# Patient Record
Sex: FEMALE | Race: OTHER | NOT HISPANIC OR LATINO
[De-identification: names, ages, dates, MRNs, and addresses within clinical notes are randomized per-mention and may not be internally consistent; named-entity substitution may affect disease eponyms.]

---

## 2018-01-09 ENCOUNTER — APPOINTMENT (OUTPATIENT)
Dept: ANTEPARTUM | Facility: CLINIC | Age: 28
End: 2018-01-09

## 2018-10-13 ENCOUNTER — EMERGENCY (EMERGENCY)
Facility: HOSPITAL | Age: 28
LOS: 1 days | Discharge: HOME | End: 2018-10-13
Attending: EMERGENCY MEDICINE | Admitting: EMERGENCY MEDICINE
Payer: MEDICAID

## 2018-10-13 VITALS
HEART RATE: 88 BPM | OXYGEN SATURATION: 100 % | RESPIRATION RATE: 16 BRPM | DIASTOLIC BLOOD PRESSURE: 74 MMHG | TEMPERATURE: 98 F | SYSTOLIC BLOOD PRESSURE: 117 MMHG

## 2018-10-13 PROCEDURE — 99282 EMERGENCY DEPT VISIT SF MDM: CPT

## 2018-10-13 NOTE — ED PROVIDER NOTE - PROGRESS NOTE DETAILS
Patient +UCG, pt with no vaginal bleeding or abdominal pain at present, desires to be discharged home, advised to take Prenatal vitamins and follow up with OB/GYN for prenatal care.

## 2018-10-13 NOTE — ED PROVIDER NOTE - PLAN OF CARE
Advance activity as tolerated.  Continue all previously prescribed medications as directed unless otherwise instructed.  Follow up with your primary care physician in 48-72 hours- bring copies of your results.  Return to the ER for worsening or persistent symptoms, and/or ANY NEW OR CONCERNING SYMPTOMS. If you have issues obtaining follow up, please call: [phone] to obtain a doctor or specialist who takes your insurance in your area.  You may call [phone] to make an appointment with the internal medicine clinic.

## 2018-10-13 NOTE — ED PROVIDER NOTE - CHIEF COMPLAINT
The patient is a 27y Female complaining of ,[email or web address]

## 2018-10-13 NOTE — ED PROVIDER NOTE - CARE PLAN
Principal Discharge DX:	First trimester pregnancy  Assessment and plan of treatment:	Advance activity as tolerated.  Continue all previously prescribed medications as directed unless otherwise instructed.  Follow up with your primary care physician in 48-72 hours- bring copies of your results.  Return to the ER for worsening or persistent symptoms, and/or ANY NEW OR CONCERNING SYMPTOMS. If you have issues obtaining follow up, please call: [phone] to obtain a doctor or specialist who takes your insurance in your area.  You may call [phone] to make an appointment with the internal medicine clinic.

## 2018-10-13 NOTE — ED PROVIDER NOTE - MEDICAL DECISION MAKING DETAILS
26 y/o F pt with no significant PMHX presents to the ED requesting a pregnancy test. Plan: pregnancy test and reassess.

## 2018-12-03 PROBLEM — Z00.00 ENCOUNTER FOR PREVENTIVE HEALTH EXAMINATION: Status: ACTIVE | Noted: 2018-12-03

## 2018-12-05 ENCOUNTER — RESULT REVIEW (OUTPATIENT)
Age: 28
End: 2018-12-05

## 2018-12-06 ENCOUNTER — APPOINTMENT (OUTPATIENT)
Dept: OBGYN | Facility: CLINIC | Age: 28
End: 2018-12-06
Payer: MEDICAID

## 2018-12-06 ENCOUNTER — OUTPATIENT (OUTPATIENT)
Dept: OUTPATIENT SERVICES | Facility: HOSPITAL | Age: 28
LOS: 1 days | End: 2018-12-06
Payer: MEDICAID

## 2018-12-06 ENCOUNTER — NON-APPOINTMENT (OUTPATIENT)
Age: 28
End: 2018-12-06

## 2018-12-06 VITALS
SYSTOLIC BLOOD PRESSURE: 115 MMHG | WEIGHT: 137 LBS | HEIGHT: 59 IN | BODY MASS INDEX: 27.62 KG/M2 | DIASTOLIC BLOOD PRESSURE: 80 MMHG

## 2018-12-06 DIAGNOSIS — Z83.3 FAMILY HISTORY OF DIABETES MELLITUS: ICD-10-CM

## 2018-12-06 DIAGNOSIS — Z34.00 ENCOUNTER FOR SUPERVISION OF NORMAL FIRST PREGNANCY, UNSPECIFIED TRIMESTER: ICD-10-CM

## 2018-12-06 LAB
APPEARANCE UR: CLEAR — SIGNIFICANT CHANGE UP
BILIRUB UR-MCNC: NEGATIVE — SIGNIFICANT CHANGE UP
COLOR SPEC: YELLOW — SIGNIFICANT CHANGE UP
DIFF PNL FLD: NEGATIVE — SIGNIFICANT CHANGE UP
GLUCOSE UR QL: NEGATIVE MG/DL — SIGNIFICANT CHANGE UP
KETONES UR-MCNC: ABNORMAL
LEUKOCYTE ESTERASE UR-ACNC: NEGATIVE — SIGNIFICANT CHANGE UP
NITRITE UR-MCNC: NEGATIVE — SIGNIFICANT CHANGE UP
PH UR: 7.5 — SIGNIFICANT CHANGE UP (ref 5–8)
PROT UR-MCNC: NEGATIVE MG/DL — SIGNIFICANT CHANGE UP
SP GR SPEC: 1.01 — SIGNIFICANT CHANGE UP (ref 1.01–1.02)
UROBILINOGEN FLD QL: NEGATIVE MG/DL — SIGNIFICANT CHANGE UP

## 2018-12-06 PROCEDURE — 81099 UNLISTED URINALYSIS PX: CPT | Mod: NC

## 2018-12-06 PROCEDURE — 83020 HEMOGLOBIN ELECTROPHORESIS: CPT

## 2018-12-06 PROCEDURE — 86901 BLOOD TYPING SEROLOGIC RH(D): CPT

## 2018-12-06 PROCEDURE — 87389 HIV-1 AG W/HIV-1&-2 AB AG IA: CPT

## 2018-12-06 PROCEDURE — 99213 OFFICE O/P EST LOW 20 MIN: CPT | Mod: NC,25

## 2018-12-06 PROCEDURE — 87086 URINE CULTURE/COLONY COUNT: CPT

## 2018-12-06 PROCEDURE — 36415 COLL VENOUS BLD VENIPUNCTURE: CPT | Mod: NC

## 2018-12-06 PROCEDURE — 81025 URINE PREGNANCY TEST: CPT

## 2018-12-06 PROCEDURE — 81025 URINE PREGNANCY TEST: CPT | Mod: NC

## 2018-12-06 PROCEDURE — G0463: CPT

## 2018-12-06 PROCEDURE — 86780 TREPONEMA PALLIDUM: CPT

## 2018-12-06 PROCEDURE — 81003 URINALYSIS AUTO W/O SCOPE: CPT

## 2018-12-06 PROCEDURE — 36415 COLL VENOUS BLD VENIPUNCTURE: CPT

## 2018-12-06 PROCEDURE — 83020 HEMOGLOBIN ELECTROPHORESIS: CPT | Mod: 26

## 2018-12-06 PROCEDURE — 86850 RBC ANTIBODY SCREEN: CPT

## 2018-12-06 PROCEDURE — 87624 HPV HI-RISK TYP POOLED RSLT: CPT

## 2018-12-06 PROCEDURE — 86900 BLOOD TYPING SEROLOGIC ABO: CPT

## 2018-12-06 PROCEDURE — 86787 VARICELLA-ZOSTER ANTIBODY: CPT

## 2018-12-07 DIAGNOSIS — Z34.02 ENCOUNTER FOR SUPERVISION OF NORMAL FIRST PREGNANCY, SECOND TRIMESTER: ICD-10-CM

## 2018-12-07 DIAGNOSIS — O09.292 SUPERVISION OF PREGNANCY WITH OTHER POOR REPRODUCTIVE OR OBSTETRIC HISTORY, SECOND TRIMESTER: ICD-10-CM

## 2018-12-07 DIAGNOSIS — Z3A.15 15 WEEKS GESTATION OF PREGNANCY: ICD-10-CM

## 2018-12-07 LAB
C TRACH RRNA SPEC QL NAA+PROBE: SIGNIFICANT CHANGE UP
C TRACH+GC RRNA SPEC QL PROBE: SIGNIFICANT CHANGE UP
CULTURE RESULTS: SIGNIFICANT CHANGE UP
HIV 1+2 AB+HIV1 P24 AG SERPL QL IA: SIGNIFICANT CHANGE UP
HPV HIGH+LOW RISK DNA PNL CVX: SIGNIFICANT CHANGE UP
N GONORRHOEA RRNA SPEC QL NAA+PROBE: SIGNIFICANT CHANGE UP
SPECIMEN SOURCE: SIGNIFICANT CHANGE UP
SPECIMEN SOURCE: SIGNIFICANT CHANGE UP
T PALLIDUM AB TITR SER: NEGATIVE — SIGNIFICANT CHANGE UP
T VAGINALIS RRNA SPEC QL NAA+PROBE: SIGNIFICANT CHANGE UP
VZV IGG FLD QL IA: 1103 INDEX — SIGNIFICANT CHANGE UP
VZV IGG FLD QL IA: POSITIVE — SIGNIFICANT CHANGE UP

## 2018-12-10 LAB
HEMOGLOBIN INTERPRETATION: SIGNIFICANT CHANGE UP
HGB A MFR BLD: 97.5 % — SIGNIFICANT CHANGE UP (ref 95.8–98)
HGB A2 MFR BLD: 2.5 % — SIGNIFICANT CHANGE UP (ref 2–3.2)

## 2018-12-13 ENCOUNTER — APPOINTMENT (OUTPATIENT)
Dept: ANTEPARTUM | Facility: CLINIC | Age: 28
End: 2018-12-13

## 2018-12-17 ENCOUNTER — APPOINTMENT (OUTPATIENT)
Dept: ANTEPARTUM | Facility: CLINIC | Age: 28
End: 2018-12-17
Payer: MEDICAID

## 2018-12-17 ENCOUNTER — ASOB RESULT (OUTPATIENT)
Age: 28
End: 2018-12-17

## 2018-12-17 DIAGNOSIS — Z3A.15 15 WEEKS GESTATION OF PREGNANCY: ICD-10-CM

## 2018-12-17 DIAGNOSIS — Z3A.16 16 WEEKS GESTATION OF PREGNANCY: ICD-10-CM

## 2018-12-17 PROCEDURE — 99203 OFFICE O/P NEW LOW 30 MIN: CPT | Mod: 25

## 2018-12-17 PROCEDURE — 76805 OB US >/= 14 WKS SNGL FETUS: CPT

## 2018-12-26 PROBLEM — Z3A.15 15 WEEKS GESTATION OF PREGNANCY: Status: RESOLVED | Noted: 2018-12-06 | Resolved: 2018-12-26

## 2018-12-26 PROBLEM — Z3A.16 16 WEEKS GESTATION OF PREGNANCY: Status: RESOLVED | Noted: 2018-12-26 | Resolved: 2018-12-26

## 2018-12-27 ENCOUNTER — NON-APPOINTMENT (OUTPATIENT)
Age: 28
End: 2018-12-27

## 2018-12-27 ENCOUNTER — OUTPATIENT (OUTPATIENT)
Dept: OUTPATIENT SERVICES | Facility: HOSPITAL | Age: 28
LOS: 1 days | End: 2018-12-27
Payer: MEDICAID

## 2018-12-27 ENCOUNTER — APPOINTMENT (OUTPATIENT)
Dept: OBGYN | Facility: CLINIC | Age: 28
End: 2018-12-27
Payer: MEDICAID

## 2018-12-27 VITALS
HEIGHT: 59 IN | DIASTOLIC BLOOD PRESSURE: 60 MMHG | WEIGHT: 140 LBS | BODY MASS INDEX: 28.22 KG/M2 | SYSTOLIC BLOOD PRESSURE: 91 MMHG

## 2018-12-27 DIAGNOSIS — Z34.00 ENCOUNTER FOR SUPERVISION OF NORMAL FIRST PREGNANCY, UNSPECIFIED TRIMESTER: ICD-10-CM

## 2018-12-27 LAB
BASOPHILS # BLD AUTO: 0.01 K/UL — SIGNIFICANT CHANGE UP (ref 0–0.2)
BASOPHILS NFR BLD AUTO: 0.1 % — SIGNIFICANT CHANGE UP (ref 0–2)
EOSINOPHIL # BLD AUTO: 0.07 K/UL — SIGNIFICANT CHANGE UP (ref 0–0.5)
EOSINOPHIL NFR BLD AUTO: 0.8 % — SIGNIFICANT CHANGE UP (ref 0–6)
HCT VFR BLD CALC: 31.8 % — LOW (ref 34.5–45)
HGB BLD-MCNC: 10.1 G/DL — LOW (ref 11.5–15.5)
IMM GRANULOCYTES NFR BLD AUTO: 0.3 % — SIGNIFICANT CHANGE UP (ref 0–1.5)
LYMPHOCYTES # BLD AUTO: 1.88 K/UL — SIGNIFICANT CHANGE UP (ref 1–3.3)
LYMPHOCYTES # BLD AUTO: 21.2 % — SIGNIFICANT CHANGE UP (ref 13–44)
MCHC RBC-ENTMCNC: 28.1 PG — SIGNIFICANT CHANGE UP (ref 27–34)
MCHC RBC-ENTMCNC: 31.8 GM/DL — LOW (ref 32–36)
MCV RBC AUTO: 88.3 FL — SIGNIFICANT CHANGE UP (ref 80–100)
MONOCYTES # BLD AUTO: 0.64 K/UL — SIGNIFICANT CHANGE UP (ref 0–0.9)
MONOCYTES NFR BLD AUTO: 7.2 % — SIGNIFICANT CHANGE UP (ref 2–14)
NEUTROPHILS # BLD AUTO: 6.23 K/UL — SIGNIFICANT CHANGE UP (ref 1.8–7.4)
NEUTROPHILS NFR BLD AUTO: 70.4 % — SIGNIFICANT CHANGE UP (ref 43–77)
PLATELET # BLD AUTO: 250 K/UL — SIGNIFICANT CHANGE UP (ref 150–400)
RBC # BLD: 3.6 M/UL — LOW (ref 3.8–5.2)
RBC # FLD: 14.1 % — SIGNIFICANT CHANGE UP (ref 10.3–14.5)
T4 FREE SERPL-MCNC: 1 NG/DL — SIGNIFICANT CHANGE UP (ref 0.9–1.8)
TSH SERPL-MCNC: 0.98 UIU/ML — SIGNIFICANT CHANGE UP (ref 0.27–4.2)
WBC # BLD: 8.86 K/UL — SIGNIFICANT CHANGE UP (ref 3.8–10.5)
WBC # FLD AUTO: 8.86 K/UL — SIGNIFICANT CHANGE UP (ref 3.8–10.5)

## 2018-12-27 PROCEDURE — 81003 URINALYSIS AUTO W/O SCOPE: CPT

## 2018-12-27 PROCEDURE — G0463: CPT

## 2018-12-27 PROCEDURE — 82677 ASSAY OF ESTRIOL: CPT

## 2018-12-27 PROCEDURE — 85027 COMPLETE CBC AUTOMATED: CPT

## 2018-12-27 PROCEDURE — 82105 ALPHA-FETOPROTEIN SERUM: CPT

## 2018-12-27 PROCEDURE — 84702 CHORIONIC GONADOTROPIN TEST: CPT

## 2018-12-27 PROCEDURE — 99213 OFFICE O/P EST LOW 20 MIN: CPT

## 2018-12-27 PROCEDURE — 86336 INHIBIN A: CPT

## 2018-12-27 PROCEDURE — 84443 ASSAY THYROID STIM HORMONE: CPT

## 2018-12-27 PROCEDURE — 84439 ASSAY OF FREE THYROXINE: CPT

## 2018-12-31 DIAGNOSIS — R00.0 TACHYCARDIA, UNSPECIFIED: ICD-10-CM

## 2018-12-31 DIAGNOSIS — Z3A.18 18 WEEKS GESTATION OF PREGNANCY: ICD-10-CM

## 2018-12-31 DIAGNOSIS — Z34.02 ENCOUNTER FOR SUPERVISION OF NORMAL FIRST PREGNANCY, SECOND TRIMESTER: ICD-10-CM

## 2018-12-31 LAB
2ND TRIMESTER DATA: SIGNIFICANT CHANGE UP
AFP SERPL-ACNC: SIGNIFICANT CHANGE UP
B-HCG FREE SERPL-MCNC: SIGNIFICANT CHANGE UP
CLINICAL BIOCHEMIST REVIEW: SIGNIFICANT CHANGE UP
DEMOGRAPHIC DATA: SIGNIFICANT CHANGE UP
INHIBIN A SERPL-MCNC: SIGNIFICANT CHANGE UP
SCREEN-FOOTER: SIGNIFICANT CHANGE UP
SCREEN-RECOMMENDATIONS: SIGNIFICANT CHANGE UP
U ESTRIOL SERPL-SCNC: SIGNIFICANT CHANGE UP

## 2019-01-08 ENCOUNTER — APPOINTMENT (OUTPATIENT)
Dept: NEUROLOGY | Facility: CLINIC | Age: 29
End: 2019-01-08
Payer: MEDICAID

## 2019-01-08 VITALS
BODY MASS INDEX: 28.22 KG/M2 | DIASTOLIC BLOOD PRESSURE: 70 MMHG | WEIGHT: 140 LBS | HEIGHT: 59 IN | SYSTOLIC BLOOD PRESSURE: 112 MMHG | HEART RATE: 94 BPM

## 2019-01-08 DIAGNOSIS — Z34.90 ENCOUNTER FOR SUPERVISION OF NORMAL PREGNANCY, UNSPECIFIED, UNSPECIFIED TRIMESTER: ICD-10-CM

## 2019-01-08 PROCEDURE — 95819 EEG AWAKE AND ASLEEP: CPT

## 2019-01-08 PROCEDURE — 99204 OFFICE O/P NEW MOD 45 MIN: CPT

## 2019-01-09 PROCEDURE — 95953: CPT

## 2019-01-10 PROCEDURE — 95953: CPT

## 2019-01-11 ENCOUNTER — OTHER (OUTPATIENT)
Age: 29
End: 2019-01-11

## 2019-01-15 ENCOUNTER — APPOINTMENT (OUTPATIENT)
Dept: NEUROLOGY | Facility: CLINIC | Age: 29
End: 2019-01-15
Payer: MEDICAID

## 2019-01-15 VITALS
BODY MASS INDEX: 28.22 KG/M2 | HEART RATE: 99 BPM | WEIGHT: 140 LBS | SYSTOLIC BLOOD PRESSURE: 111 MMHG | HEIGHT: 59 IN | DIASTOLIC BLOOD PRESSURE: 69 MMHG

## 2019-01-15 DIAGNOSIS — G40.909 EPILEPSY, UNSPECIFIED, NOT INTRACTABLE, W/OUT STATUS EPILEPTICUS: ICD-10-CM

## 2019-01-15 PROBLEM — Z34.90 PREGNANCY: Noted: 2019-01-15

## 2019-01-15 PROCEDURE — 99214 OFFICE O/P EST MOD 30 MIN: CPT

## 2019-01-15 NOTE — PHYSICAL EXAM
[FreeTextEntry1] : alert and oriented x 3, speech fluent, names easily, follows requests, good recall for recent and remote events.\par EOM full without sustained nystagmus, PERRL, face symmetrical, no dysarthria\par Motor - full strength in all extremities. normal rapid-alternating movements.\par Sensory - intact LT bilaterally\par Coord - no tremor, ataxia\par Gait - stands without difficulty, normal gait.

## 2019-01-15 NOTE — ASSESSMENT
[FreeTextEntry1] : 28 years old woman with myoclonic jerks unclear if epileptic or not, no history of convulsion. The response to AED in the past make the epileptic etiology most likely or possibly in the context of idiopathic generalized epilepsy like AGUSTIN in the right clinical context.  Her neurological exam is nonfocal and she had several myoclonic jerks while in the office.\par \par Plan: \par 1. ambulatory EEG for event characterization. \par 2. If events captured and with EEG correlate may consider an AED with safer profile for pregnancy, possibly Keppra.\par 3. follow up in 1 week and further management will be discussed based on results of EEG.\par \par

## 2019-01-15 NOTE — HISTORY OF PRESENT ILLNESS
[FreeTextEntry1] : Ms. PASCUAL is a 28 years old  2( first pregnancy still birth due to diaphragmatic hernia) with history of myoclonic jerks since 2006 while she was living in [facility]. In  she immigrated in  and was evaluated by a neurologist who started her on VPA and Keppra(?). Brain MRI and EEG were done but results are not available. \par \par Now she is 20 weeks pregnant and continue to have frequent myoclonic jerks but no generalized seizures described. Her symptoms are worsen by sleep deprivation and stress  Unclear if they are worsen in am or not. She endorsed improvement in her symptoms while on VPA and Keppra but she discontinued medication several months prior. She is here at her ObGyn recommendation on further management of her condition.

## 2019-01-15 NOTE — END OF VISIT
[FreeTextEntry3] : Ms. MOST PASCUAL was seen with Epilepsy fellow, Dr. Shama Riley.  I reviewed history with patient and family, examined Ms. PASCUAL , and edited the note.\par

## 2019-01-16 ENCOUNTER — APPOINTMENT (OUTPATIENT)
Dept: CARDIOLOGY | Facility: CLINIC | Age: 29
End: 2019-01-16
Payer: MEDICAID

## 2019-01-16 ENCOUNTER — ASOB RESULT (OUTPATIENT)
Age: 29
End: 2019-01-16

## 2019-01-16 ENCOUNTER — APPOINTMENT (OUTPATIENT)
Dept: ANTEPARTUM | Facility: CLINIC | Age: 29
End: 2019-01-16
Payer: MEDICAID

## 2019-01-16 VITALS
BODY MASS INDEX: 28.22 KG/M2 | HEART RATE: 107 BPM | TEMPERATURE: 98.4 F | HEIGHT: 59 IN | DIASTOLIC BLOOD PRESSURE: 67 MMHG | SYSTOLIC BLOOD PRESSURE: 102 MMHG | OXYGEN SATURATION: 98 % | WEIGHT: 140 LBS

## 2019-01-16 PROCEDURE — 93000 ELECTROCARDIOGRAM COMPLETE: CPT

## 2019-01-16 PROCEDURE — 99204 OFFICE O/P NEW MOD 45 MIN: CPT

## 2019-01-16 PROCEDURE — 76817 TRANSVAGINAL US OBSTETRIC: CPT | Mod: 59

## 2019-01-16 PROCEDURE — 76811 OB US DETAILED SNGL FETUS: CPT

## 2019-01-18 NOTE — ASSESSMENT
[FreeTextEntry1] : 28 years old woman with myoclonic jerks unclear if epileptic or not, no history of convulsion. The response to AED in the past make the epileptic etiology possible as a result of idiopathic generalized epilepsy like AGUSTIN in the right clinical context.  Her neurological exam is nonfocal and she had several myoclonic jerks while in the office. 48h ambulatory EEG is negative, however, no video available to correlate with jerks.\par \par Plan: \par 1. EMU eval to capture video-EEG of jerks with addition EMG leads and definitively determine that there is no EEG correlate.  \par 2. would not start AED at this point with results thus far of evaluation. Ms. PASCUAL has never had convulsion. \par 3. Ms. PASCUAL is uncertain as to how she would like to proceed, will discuss with her  and let us know about EMU eval. \par \par Greater than 50% of the encounter time was spent on counseling and coordination of care for reviewing records in [employer], discussion with patient regarding plan. I have spent 25 minutes of face to face time with the patient reviewing the cause of seizures or seizure-like events, assessing the risk of recurrence, educating the patient or family to recognize seizures, and discussing possible treatment options and possible side effects of seizure medications. I also discussed seizure safety, and ways of reducing seizure risk, and value of EMU evaluation in determining cause of jerks.\par

## 2019-01-18 NOTE — HISTORY OF PRESENT ILLNESS
[FreeTextEntry1] : *** 01/15/2019 ***\par Ms. PASCUAL returns for f/u.  No interval problems. The 48 hours EEG was normal.\par Ms. PASCUAL is still having myoclonic jerks.\par \par 2019\par Ms. PASCUAL is a 28 years old  2( first pregnancy still birth due to diaphragmatic hernia) with history of myoclonic jerks since  while she was living in [facility]. In  she immigrated in  and was evaluated by a neurologist who started her on VPA and Keppra(?). Brain MRI and EEG were done but results are not available. \par \par Now she is 20 weeks pregnant and continue to have frequent myoclonic jerks but no generalized seizures described. Her symptoms are worsen by sleep deprivation and stress  Unclear if they are worsen in am or not. She endorsed improvement in her symptoms while on VPA and Keppra but she discontinued medication several months prior. She is here at her ObGyn recommendation on further management of her condition.

## 2019-01-24 ENCOUNTER — OUTPATIENT (OUTPATIENT)
Dept: OUTPATIENT SERVICES | Facility: HOSPITAL | Age: 29
LOS: 1 days | End: 2019-01-24
Payer: MEDICAID

## 2019-01-24 ENCOUNTER — NON-APPOINTMENT (OUTPATIENT)
Age: 29
End: 2019-01-24

## 2019-01-24 ENCOUNTER — APPOINTMENT (OUTPATIENT)
Dept: OBGYN | Facility: CLINIC | Age: 29
End: 2019-01-24
Payer: MEDICAID

## 2019-01-24 VITALS
SYSTOLIC BLOOD PRESSURE: 113 MMHG | HEIGHT: 59 IN | DIASTOLIC BLOOD PRESSURE: 69 MMHG | BODY MASS INDEX: 29.23 KG/M2 | WEIGHT: 145 LBS

## 2019-01-24 DIAGNOSIS — Z34.00 ENCOUNTER FOR SUPERVISION OF NORMAL FIRST PREGNANCY, UNSPECIFIED TRIMESTER: ICD-10-CM

## 2019-01-24 PROCEDURE — 81003 URINALYSIS AUTO W/O SCOPE: CPT

## 2019-01-24 PROCEDURE — G0463: CPT

## 2019-01-24 PROCEDURE — 99213 OFFICE O/P EST LOW 20 MIN: CPT | Mod: NC

## 2019-01-24 PROCEDURE — 81099 UNLISTED URINALYSIS PX: CPT | Mod: NC

## 2019-01-29 ENCOUNTER — OUTPATIENT (OUTPATIENT)
Dept: OUTPATIENT SERVICES | Facility: HOSPITAL | Age: 29
LOS: 1 days | End: 2019-01-29
Payer: MEDICAID

## 2019-01-29 DIAGNOSIS — Z3A.22 22 WEEKS GESTATION OF PREGNANCY: ICD-10-CM

## 2019-01-29 DIAGNOSIS — Z34.02 ENCOUNTER FOR SUPERVISION OF NORMAL FIRST PREGNANCY, SECOND TRIMESTER: ICD-10-CM

## 2019-01-29 DIAGNOSIS — O09.292 SUPERVISION OF PREGNANCY WITH OTHER POOR REPRODUCTIVE OR OBSTETRIC HISTORY, SECOND TRIMESTER: ICD-10-CM

## 2019-01-29 DIAGNOSIS — R00.0 TACHYCARDIA, UNSPECIFIED: ICD-10-CM

## 2019-01-29 DIAGNOSIS — G25.3 MYOCLONUS: ICD-10-CM

## 2019-01-29 PROCEDURE — 93306 TTE W/DOPPLER COMPLETE: CPT

## 2019-01-30 NOTE — ASSESSMENT
[FreeTextEntry1] : 28-year-old female at ~20 weeks gestation with history of myoclonic jerks, who presents for evaluation of tachycardia. \par \par 1.Tachycardia: This is a nonspecific finding and likely reflects a normal physiologic response. Possible factors include patient's borderline anemia of pregnancy, versus some anxiety in the health care setting, given the heart rate variability observed over today's visit\par -Patient was reassured that this is most likely a benign finding\par -I recommended that patient undergo an echocardiogram to rule out any occult structural heart disease that may contribute to tachycardia, though my clinical suspicion for this is very low based on patient's otherwise lack of symptoms as well as unremarkable physical exam\par \par Will call patient with results at [phone], alternate # is her aunt at [phone]

## 2019-01-30 NOTE — HISTORY OF PRESENT ILLNESS
[FreeTextEntry1] : 28-year-old female at ~20 weeks gestation with history of myoclonic jerks, who presents for evaluation of tachycardia. Patient denies any specific complaints, though she has noticed that she has been getting some exertional dyspnea when going up stairs that has progressed throughout her pregnancy. However, patient states the symptoms do not limit her activity level. She otherwise denies any chest pain, palpitations, lightheadedness, or syncope. No hot flashes or uncomfortable sensation of feeling hot, no orthopnea, lower extremity edema, or PND. Patient reports that she occasionally feels nervous at physician's offices and feels that her heart rate becomes faster during those times. Patient's heart rate changed from the 90s to 112 bpm during the time of my examination, however she did not feel that her heart was going fast at that time, nor did she have any complaints during my exam.

## 2019-01-30 NOTE — ADDENDUM
[FreeTextEntry1] : ADDENDUM 1/30: Echocardiogram showed grossly preserved EF, normal diastolic function, otherwise unremarkable

## 2019-02-01 ENCOUNTER — INPATIENT (INPATIENT)
Facility: HOSPITAL | Age: 29
LOS: 0 days | Discharge: HOME | DRG: 833 | End: 2019-02-02
Attending: PSYCHIATRY & NEUROLOGY | Admitting: PSYCHIATRY & NEUROLOGY
Payer: MEDICAID

## 2019-02-01 VITALS — WEIGHT: 136.47 LBS | HEIGHT: 59 IN

## 2019-02-01 DIAGNOSIS — Z3A.23 23 WEEKS GESTATION OF PREGNANCY: ICD-10-CM

## 2019-02-01 DIAGNOSIS — G40.909 EPILEPSY, UNSPECIFIED, NOT INTRACTABLE, WITHOUT STATUS EPILEPTICUS: ICD-10-CM

## 2019-02-01 LAB
ALBUMIN SERPL ELPH-MCNC: 4.2 G/DL — SIGNIFICANT CHANGE UP (ref 3.3–5)
ALP SERPL-CCNC: 59 U/L — SIGNIFICANT CHANGE UP (ref 40–120)
ALT FLD-CCNC: 13 U/L — SIGNIFICANT CHANGE UP (ref 10–45)
ANION GAP SERPL CALC-SCNC: 13 MMOL/L — SIGNIFICANT CHANGE UP (ref 5–17)
AST SERPL-CCNC: 13 U/L — SIGNIFICANT CHANGE UP (ref 10–40)
BASOPHILS # BLD AUTO: 0.01 K/UL — SIGNIFICANT CHANGE UP (ref 0–0.2)
BASOPHILS NFR BLD AUTO: 0.1 % — SIGNIFICANT CHANGE UP (ref 0–2)
BILIRUB SERPL-MCNC: 0.1 MG/DL — LOW (ref 0.2–1.2)
BUN SERPL-MCNC: 5 MG/DL — LOW (ref 7–23)
CALCIUM SERPL-MCNC: 9.1 MG/DL — SIGNIFICANT CHANGE UP (ref 8.4–10.5)
CHLORIDE SERPL-SCNC: 103 MMOL/L — SIGNIFICANT CHANGE UP (ref 96–108)
CO2 SERPL-SCNC: 21 MMOL/L — LOW (ref 22–31)
CREAT SERPL-MCNC: 0.36 MG/DL — LOW (ref 0.5–1.3)
EOSINOPHIL # BLD AUTO: 0.04 K/UL — SIGNIFICANT CHANGE UP (ref 0–0.5)
EOSINOPHIL NFR BLD AUTO: 0.4 % — SIGNIFICANT CHANGE UP (ref 0–6)
GLUCOSE SERPL-MCNC: 103 MG/DL — HIGH (ref 70–99)
HCT VFR BLD CALC: 30.6 % — LOW (ref 34.5–45)
HGB BLD-MCNC: 9.8 G/DL — LOW (ref 11.5–15.5)
IMM GRANULOCYTES NFR BLD AUTO: 0.4 % — SIGNIFICANT CHANGE UP (ref 0–1.5)
LYMPHOCYTES # BLD AUTO: 1.93 K/UL — SIGNIFICANT CHANGE UP (ref 1–3.3)
LYMPHOCYTES # BLD AUTO: 20.9 % — SIGNIFICANT CHANGE UP (ref 13–44)
MCHC RBC-ENTMCNC: 28.4 PG — SIGNIFICANT CHANGE UP (ref 27–34)
MCHC RBC-ENTMCNC: 32 GM/DL — SIGNIFICANT CHANGE UP (ref 32–36)
MCV RBC AUTO: 88.7 FL — SIGNIFICANT CHANGE UP (ref 80–100)
MONOCYTES # BLD AUTO: 0.61 K/UL — SIGNIFICANT CHANGE UP (ref 0–0.9)
MONOCYTES NFR BLD AUTO: 6.6 % — SIGNIFICANT CHANGE UP (ref 2–14)
NEUTROPHILS # BLD AUTO: 6.62 K/UL — SIGNIFICANT CHANGE UP (ref 1.8–7.4)
NEUTROPHILS NFR BLD AUTO: 71.6 % — SIGNIFICANT CHANGE UP (ref 43–77)
PLATELET # BLD AUTO: 191 K/UL — SIGNIFICANT CHANGE UP (ref 150–400)
POTASSIUM SERPL-MCNC: 3.7 MMOL/L — SIGNIFICANT CHANGE UP (ref 3.5–5.3)
POTASSIUM SERPL-SCNC: 3.7 MMOL/L — SIGNIFICANT CHANGE UP (ref 3.5–5.3)
PROT SERPL-MCNC: 7.1 G/DL — SIGNIFICANT CHANGE UP (ref 6–8.3)
RBC # BLD: 3.45 M/UL — LOW (ref 3.8–5.2)
RBC # FLD: 13.9 % — SIGNIFICANT CHANGE UP (ref 10.3–14.5)
SODIUM SERPL-SCNC: 137 MMOL/L — SIGNIFICANT CHANGE UP (ref 135–145)
WBC # BLD: 9.25 K/UL — SIGNIFICANT CHANGE UP (ref 3.8–10.5)
WBC # FLD AUTO: 9.25 K/UL — SIGNIFICANT CHANGE UP (ref 3.8–10.5)

## 2019-02-01 PROCEDURE — 95819 EEG AWAKE AND ASLEEP: CPT | Mod: 26

## 2019-02-01 PROCEDURE — 99221 1ST HOSP IP/OBS SF/LOW 40: CPT

## 2019-02-01 PROCEDURE — 99223 1ST HOSP IP/OBS HIGH 75: CPT | Mod: GC

## 2019-02-01 PROCEDURE — 95951: CPT | Mod: 26

## 2019-02-01 NOTE — H&P ADULT - ASSESSMENT
Ms. PASCUAL is a 28 years old  2 (first pregnancy still birth due to diaphragmatic hernia) with history of myoclonic jerks since  while she was living in [facility]. In  she [place] in [place] and was evaluated by a neurologist who started her on VPA and Keppra(?). Brain MRI and EEG were done but results are not available. Now she is 21 weeks pregnant and continue to have frequent myoclonic jerks but no generalized seizures described. Her symptoms are worsen by sleep deprivation and stress Unclear if they are worsen in am or not. She endorsed improvement in her symptoms while on VPA and Keppra but she discontinued medication several months prior. 48h ambulatory EEG is negative, however, no video available to correlate with jerks. Her neurological examination was unremarkable.     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

## 2019-02-01 NOTE — CONSULT NOTE ADULT - SUBJECTIVE AND OBJECTIVE BOX
R2 Ob Consult    28y  23w1d based on LMP 18 consistent with first trimester sono presents with myoclonic jerks. OB consulted for pregnancy. She states that the pregnancy has only been significant for the myoclonic jerks that encompass her full body. She reports intermittent HAs that are mild and resolve with rest. She has no obstetrical complaints. She denies fevers, chills, sweats, CP, SOB, palpitations, abd pain, vaginal discharge/bleeding, LOF, dysuria. Reports good fetal movement.     HPI:  Ms. PASCUAL is a 28 years old  2 (first pregnancy still birth due to diaphragmatic hernia) with history of myoclonic jerks since  while she was living in [facility]. In  she immigrated in  and was evaluated by a neurologist who started her on VPA and Keppra(?). Brain MRI and EEG were done but results are not available. Now she is 21 weeks pregnant and continue to have frequent myoclonic jerks but no generalized seizures described. Her symptoms are worsen by sleep deprivation and stress Unclear if they are worsen in am or not. She endorsed improvement in her symptoms while on VPA and Keppra but she discontinued medication several months prior. 48h ambulatory EEG is negative, however, no video available to correlate with jerks. (2019 17:37)      OB/GYN HISTORY:   [place]: 2  Parity: 0  Term: 0  : 1 Termination of pregnancy at 23 weeks in 2017 for congenital diaphragmatic hernia, was induced with medication and delivered vaginally  MAB/TAB/Ectopic: 0  Livin    Last Menstrual Period 18    Name of GYN Physician: [email or web address] Dr. Sanz  Denies fibroids, abnl paps, ovarian cysts, STIs     PAST MEDICAL & SURGICAL HISTORY:  History of seizures - not currently on any anti-epileptics      REVIEW OF SYSTEMS: Negative except for outlined in HPI    MEDICATIONS  (STANDING):    MEDICATIONS  (PRN):      Allergies  No Known Allergies    SOCIAL HISTORY: denies x3    FAMILY HISTORY: denies HTN, DM, bleeding and clotting disorders    Vital Signs Last 24 Hrs  T(C): 36.9 (2019 18:19), Max: 36.9 (2019 18:19)  T(F): 98.4 (2019 18:19), Max: 98.4 (2019 18:19)  HR: 102 (2019 18:19) (102 - 102)  BP: 99/67 (2019 18:19) (99/67 - 99/67)  BP(mean): --  RR: 18 (2019 18:19) (18 - 18)  SpO2: 100% (2019 18:19) (100% - 100%)    PHYSICAL EXAM:  Constitutional: alert and oriented x 3  Respiratory: clear  Cardiovascular: regular rate and rhythm  Gastrointestinal: soft, non tender, gravid with fundal height at umbilicus, + bowel sounds. No hepatosplenomegaly, no palpable masses  TAUS: fundal anterior placenta, vertex presentation, , gross fetal movement, MVP 4.3

## 2019-02-01 NOTE — CONSULT NOTE ADULT - ASSESSMENT
27 yo  23w1d JOBY 19 based on LMP consistent with first trimester US a/w myoclonic jerks. Consulted for viable pregnancy. No acute obstetrical concerns at this time.

## 2019-02-01 NOTE — H&P ADULT - HISTORY OF PRESENT ILLNESS
Ms. PASCUAL is a 28 years old  2 (first pregnancy still birth due to diaphragmatic hernia) with history of myoclonic jerks since 2006 while she was living in [facility]. In  [place] in [place] and was evaluated by a neurologist who started her on VPA and Keppra(?). Brain MRI and EEG were done but results are not available. Now she is 21 weeks pregnant and continue to have frequent myoclonic jerks but no generalized seizures described. Her symptoms are worsen by sleep deprivation and stress Unclear if they are worsen in am or not. She endorsed improvement in her symptoms while on VPA and Keppra but she discontinued medication several months prior. 48h ambulatory EEG is negative, however, no video available to correlate with jerks.

## 2019-02-01 NOTE — CONSULT NOTE ADULT - PROBLEM SELECTOR RECOMMENDATION 9
- no acute interventions at this time  - depending on medications started for myoclonic jerks, would consider monitoring for pregnancy but no monitoring needed unless she had obstetrical complaints (contractions, loss of amniotic fluid, vaginal bleeding)    d/w Dr. Felicity Pizano pgy2 - no acute interventions at this time  - recommend daily FHR checks  - please contact Ob/GYN if she begins to have any obstetrical complaints (contractions, loss of amniotic fluid, vaginal bleeding, decreased fetal movement)    d/w Dr. Felicity Pizano pgy2

## 2019-02-01 NOTE — CONSULT NOTE ADULT - ATTENDING COMMENTS
This 29y/o  at 23.1wks admitted to Neurology 2nd to myoclonic jerks, was discussed with me.  I have read and reviewed the above assessment and plan and I agree.    Pt with hx of myoclonic jerks (no generalized seizures and not witnessed) and currently being monitored on Neurology service.  Normal EEG.  No OB complications at this time. As per M, to monitor FHR daily.    Please call OB service if any questions or complications associated with pregnancy.    Thank you for the consult.    Dr. Blevins

## 2019-02-02 ENCOUNTER — TRANSCRIPTION ENCOUNTER (OUTPATIENT)
Age: 29
End: 2019-02-02

## 2019-02-02 VITALS
RESPIRATION RATE: 18 BRPM | OXYGEN SATURATION: 99 % | HEART RATE: 102 BPM | TEMPERATURE: 98 F | SYSTOLIC BLOOD PRESSURE: 102 MMHG | DIASTOLIC BLOOD PRESSURE: 66 MMHG

## 2019-02-02 PROCEDURE — 95951: CPT | Mod: 26,52

## 2019-02-02 PROCEDURE — 80053 COMPREHEN METABOLIC PANEL: CPT

## 2019-02-02 PROCEDURE — 99233 SBSQ HOSP IP/OBS HIGH 50: CPT | Mod: GC

## 2019-02-02 PROCEDURE — 99232 SBSQ HOSP IP/OBS MODERATE 35: CPT | Mod: 25

## 2019-02-02 PROCEDURE — 95951: CPT

## 2019-02-02 PROCEDURE — 95819 EEG AWAKE AND ASLEEP: CPT

## 2019-02-02 PROCEDURE — 85027 COMPLETE CBC AUTOMATED: CPT

## 2019-02-02 NOTE — DISCHARGE NOTE ADULT - CARE PROVIDER_API CALL
Donn Aviles (MD)  Clinical Neurophysiology; EEGEpilepsy; Neurology  [address]  [place] [address]  Phone: [phone]  Fax: [phone]

## 2019-02-02 NOTE — PROVIDER CONTACT NOTE (OTHER) - ASSESSMENT
Pt alert and oriented. No complaints of dizziness. Pt states that she did not eat much, only an orange and a banana and that is why she believes her blood pressure is low.

## 2019-02-02 NOTE — DISCHARGE NOTE ADULT - NS AS DC STROKE ED MATERIALS
September 5, 2017      Gina Robles MD  [address] [place] [address]  [address]  [place] [address]           [facility]  [address]  [facility] [address]  Phone: [phone]  Fax: [phone]          Patient: Polo oJnes   MR Number: [id number]   YOB: 1976   Date of Visit: 9/5/2017       Dear Dr. Gina Robles:    Thank you for referring Polo Jones to me for evaluation. Attached you will find relevant portions of my assessment and plan of care.    If you have questions, please do not hesitate to call me. I look forward to following Polo Jones along with you.    Sincerely,    Jere Vizcaino MD    Enclosure  CC:  No Recipients    If you would like to receive this communication electronically, please contact [email or web address] or [phone] to request more information on [employer] Link access.    For providers and/or their staff who would like to refer a patient to [facility], please contact us through our one-stop-shop provider referral line, [facility], at [phone].    If you feel you have received this communication in error or would no longer like to receive these types of communications, please e-mail [email or web address]        Call 911 for Stroke/Stroke Warning Signs and Symptoms/Risk Factors for Stroke/Prescribed Medications/Need for Followup After Discharge/Stroke Education Booklet

## 2019-02-02 NOTE — DISCHARGE NOTE ADULT - HOSPITAL COURSE
28 years old  2 (first pregnancy still birth due to diaphragmatic hernia) with history of myoclonic jerks since 2006 while she was living in [facility]. In  she immigrated in  and was evaluated by a neurologist who started her on VPA and Keppra(?). Brain MRI and EEG were done but results are not available. Now she is 21 weeks pregnant and continue to have frequent myoclonic jerks but no generalized seizures described. Her symptoms are worsen by sleep deprivation and stress Unclear if they are worsen in am or not. She endorsed improvement in her symptoms while on VPA and Keppra but she discontinued medication several months prior. 48h ambulatory EEG is negative, however, no video available to correlate with jerks. Her neurological examination was unremarkable.    Patient admitted to EMU for further monitoring.  Since admission, there has been no noted seizure activity on EEG.  Patient is neurologically stable for discharge home with followup at epilepsy neurology. 28 years old  2 (first pregnancy still birth due to diaphragmatic hernia) with history of myoclonic jerks since 2006 while she was living in [facility]. In  [place] in [place] and was evaluated by a neurologist who started her on VPA and Keppra(?). Brain MRI and EEG were done but results are not available. Now she is 21 weeks pregnant and continue to have frequent myoclonic jerks but no generalized seizures described. Her symptoms are worsen by sleep deprivation and stress Unclear if they are worsen in am or not. She endorsed improvement in her symptoms while on VPA and Keppra but she discontinued medication several months prior. 48h ambulatory EEG is negative, however, no video available to correlate with jerks. Her neurological examination was unremarkable.    Patient admitted to EMU for further monitoring. Numerous habitual jerks captured without ictal abnormality on EEG. Patient is neurologically stable for discharge home with followup at epilepsy neurology.

## 2019-02-02 NOTE — DISCHARGE NOTE ADULT - ADDITIONAL INSTRUCTIONS
Patient will followup with Dr Aviles on 3/6/19 at 3:30PM at [address] [place] [address]. Patient will followup with Dr Aviles on 3/6/19 at 12:30PM at [address] [place] [address].

## 2019-02-02 NOTE — DISCHARGE NOTE ADULT - CARE PLAN
Principal Discharge DX:	Movement disorder  Goal:	secondary prevention  Assessment and plan of treatment:	Patient should followup with epilepsy neurology for further management

## 2019-02-02 NOTE — PROVIDER CONTACT NOTE (OTHER) - ASSESSMENT
Patient is AOx4, denies pain, vitals signs intact (95/64, 98 HR, 97.5 F, 99% RA, 20 RR), no post ictal. Stated episode only lasted a few seconds with a few body jerks.

## 2019-02-02 NOTE — PROVIDER CONTACT NOTE (OTHER) - ACTION/TREATMENT ORDERED:
As per Erin IRIZARRY BP OK, will continue to monitor as per protocol. Advice patient to consume more fluids and food.

## 2019-02-02 NOTE — DISCHARGE NOTE ADULT - PATIENT PORTAL LINK FT
You can access the [facility] Patient Portal, offered by [facility], by registering with the following website: [email or web address]

## 2019-02-02 NOTE — CHART NOTE - NSCHARTNOTEFT_GEN_A_CORE
R3 OB     Pt seen and examined with Dr. Cox. She has no obstetrical complaints. She has a history of IOL termination due to congenital diaphragmatic hernia at 23wks and was told that depakote and keppra caused the malformation.  She was counseled that it is safe to start antiepileptics at this gestational age given that organogenesis is completed. She does not want to take any medications while she is pregnant due to fear of harming the baby. It was explained that the benefits of AED would outweigh any risks at this gestational age, however patient still refusing. Would recommend restarting any necessary antiseizure medications as determined indicated by neurology.     FH check 140-150s.     d/w Dr. Kenny Srinivasan PGY3 R3 OB     Pt seen and examined with Dr. Cox. She has no obstetrical complaints. She has a history of IOL termination due to congenital diaphragmatic hernia at 23wks and was told that depakote and keppra caused the malformation.  She was counseled that it is safe to start antiepileptics at this gestational age given that organogenesis is completed. She does not want to take any medications while she is pregnant due to fear of harming the baby. It was explained that the benefits of AED would outweigh any risks at this gestational age, however patient still refusing. Would recommend restarting any necessary antiseizure medications as determined indicated by neurology.     FH check 140-150s.     d/w Dr. Kenny Miramontes-Jazzy PGY3    MFM Attending.    I saw and evaluated the patient at the bedside, and as Dr. Miramontes points out, the patient is refusing any antiepileptic medications. We discussed the specific medications suggested and their safety at this gestational age, and she declines. Active seizures may be more harmful in pregnancy than treatment. The patient also previously had a consultation on this issue with our MFM colleague, Dr. Costa. Although any suggested medications are likely to be safe at this gestational age, please consult with us regarding safety prior to beginning administration.

## 2019-02-06 ENCOUNTER — APPOINTMENT (OUTPATIENT)
Dept: NEUROLOGY | Facility: CLINIC | Age: 29
End: 2019-02-06
Payer: MEDICAID

## 2019-02-06 VITALS
BODY MASS INDEX: 29.23 KG/M2 | SYSTOLIC BLOOD PRESSURE: 111 MMHG | HEART RATE: 110 BPM | HEIGHT: 59 IN | DIASTOLIC BLOOD PRESSURE: 74 MMHG | WEIGHT: 145 LBS

## 2019-02-06 DIAGNOSIS — G25.3 MYOCLONUS: ICD-10-CM

## 2019-02-06 PROCEDURE — 99214 OFFICE O/P EST MOD 30 MIN: CPT

## 2019-02-08 PROBLEM — G25.3 MYOCLONIC JERKING: Noted: 2019-01-15

## 2019-02-08 NOTE — ASSESSMENT
[FreeTextEntry1] : 28 years old woman with axial and facial jerks that have no correlate on EEG. EMU video also reviewed with Dr. Brown, movement disorder specialist. It is my impression, endorsed by Dr. Brown, that jerks appear to be tics, not myoclonic/epileptic twitch. \par \par Plan: \par 1. EMU eval captured jerks - neck flexion, face grimace, blepharospasm - no EEG correlate - likely tics. \par 2. would not start AED. Ms. PASCUAL has never had convulsion. Seizure disorder is unlikely, more likely has tic d/o and course is benign. \par 3. RTC - June - after delivery to discuss whether she wishes to take medication to suppress tics.\par \par Greater than 50% of the encounter time was spent on counseling and coordination of care for reviewing records in [employer], discussion with patient regarding plan. I have spent 25 minutes of face to face time with the patient reviewing the cause of tics, and discussing possible treatment options.

## 2019-02-08 NOTE — HISTORY OF PRESENT ILLNESS
[FreeTextEntry1] : *** 2019 ***\par Ms. PASCUAL returns for f/u after EMU evaluation.  EMU video captured multiple jerks mostly involving head dip, sometimes facial twitch or blepharospsam. No paroxysmal EEG activity noted through recording. Video annotations note that Ms. PASCUAL states that she can voluntarily suppress jerks, but that the jerks will emerge later, sometimes in series. Ms. PASCUAL also endorses that she feels an urge with the jerks. \par \par *** 01/15/2019 ***\par Ms. PASCUAL returns for f/u.  No interval problems. The 48 hours EEG was normal.\par Ms. PASCUAL is still having myoclonic jerks.\par \par 2019\par Ms. PASCUAL is a 28 years old  2( first pregnancy still birth due to diaphragmatic hernia) with history of myoclonic jerks since  while she was living in [facility]. In  she immigrated in  and was evaluated by a neurologist who started her on VPA and Keppra(?). Brain MRI and EEG were done but results are not available. \par \par Now she is 20 weeks pregnant and continue to have frequent myoclonic jerks but no generalized seizures described. Her symptoms are worsen by sleep deprivation and stress  Unclear if they are worsen in am or not. She endorsed improvement in her symptoms while on VPA and Keppra but she discontinued medication several months prior. She is here at her ObGyn recommendation on further management of her condition.

## 2019-02-13 ENCOUNTER — ASOB RESULT (OUTPATIENT)
Age: 29
End: 2019-02-13

## 2019-02-13 ENCOUNTER — APPOINTMENT (OUTPATIENT)
Dept: ANTEPARTUM | Facility: CLINIC | Age: 29
End: 2019-02-13
Payer: MEDICAID

## 2019-02-13 PROCEDURE — 76816 OB US FOLLOW-UP PER FETUS: CPT

## 2019-02-21 ENCOUNTER — OUTPATIENT (OUTPATIENT)
Dept: OUTPATIENT SERVICES | Facility: HOSPITAL | Age: 29
LOS: 1 days | End: 2019-02-21
Payer: MEDICAID

## 2019-02-21 ENCOUNTER — APPOINTMENT (OUTPATIENT)
Dept: OBGYN | Facility: CLINIC | Age: 29
End: 2019-02-21
Payer: MEDICAID

## 2019-02-21 VITALS
WEIGHT: 148 LBS | DIASTOLIC BLOOD PRESSURE: 77 MMHG | HEIGHT: 59 IN | BODY MASS INDEX: 29.84 KG/M2 | SYSTOLIC BLOOD PRESSURE: 113 MMHG

## 2019-02-21 DIAGNOSIS — Z34.00 ENCOUNTER FOR SUPERVISION OF NORMAL FIRST PREGNANCY, UNSPECIFIED TRIMESTER: ICD-10-CM

## 2019-02-21 LAB
BASOPHILS # BLD AUTO: 0.02 K/UL — SIGNIFICANT CHANGE UP (ref 0–0.2)
BASOPHILS NFR BLD AUTO: 0.2 % — SIGNIFICANT CHANGE UP (ref 0–2)
EOSINOPHIL # BLD AUTO: 0.03 K/UL — SIGNIFICANT CHANGE UP (ref 0–0.5)
EOSINOPHIL NFR BLD AUTO: 0.3 % — SIGNIFICANT CHANGE UP (ref 0–6)
HCT VFR BLD CALC: 31.9 % — LOW (ref 34.5–45)
HGB BLD-MCNC: 10 G/DL — LOW (ref 11.5–15.5)
IMM GRANULOCYTES NFR BLD AUTO: 1.2 % — SIGNIFICANT CHANGE UP (ref 0–1.5)
LYMPHOCYTES # BLD AUTO: 1.44 K/UL — SIGNIFICANT CHANGE UP (ref 1–3.3)
LYMPHOCYTES # BLD AUTO: 15.5 % — SIGNIFICANT CHANGE UP (ref 13–44)
MCHC RBC-ENTMCNC: 29.1 PG — SIGNIFICANT CHANGE UP (ref 27–34)
MCHC RBC-ENTMCNC: 31.3 GM/DL — LOW (ref 32–36)
MCV RBC AUTO: 92.7 FL — SIGNIFICANT CHANGE UP (ref 80–100)
MONOCYTES # BLD AUTO: 0.59 K/UL — SIGNIFICANT CHANGE UP (ref 0–0.9)
MONOCYTES NFR BLD AUTO: 6.4 % — SIGNIFICANT CHANGE UP (ref 2–14)
NEUTROPHILS # BLD AUTO: 7.08 K/UL — SIGNIFICANT CHANGE UP (ref 1.8–7.4)
NEUTROPHILS NFR BLD AUTO: 76.4 % — SIGNIFICANT CHANGE UP (ref 43–77)
PLATELET # BLD AUTO: 191 K/UL — SIGNIFICANT CHANGE UP (ref 150–400)
RBC # BLD: 3.44 M/UL — LOW (ref 3.8–5.2)
RBC # FLD: 13.8 % — SIGNIFICANT CHANGE UP (ref 10.3–14.5)
WBC # BLD: 9.27 K/UL — SIGNIFICANT CHANGE UP (ref 3.8–10.5)
WBC # FLD AUTO: 9.27 K/UL — SIGNIFICANT CHANGE UP (ref 3.8–10.5)

## 2019-02-21 PROCEDURE — G0463: CPT

## 2019-02-21 PROCEDURE — 82950 GLUCOSE TEST: CPT

## 2019-02-21 PROCEDURE — 81003 URINALYSIS AUTO W/O SCOPE: CPT

## 2019-02-21 PROCEDURE — 85027 COMPLETE CBC AUTOMATED: CPT

## 2019-02-21 PROCEDURE — 99213 OFFICE O/P EST LOW 20 MIN: CPT

## 2019-02-22 LAB — GLUCOSE 1H P GLC SERPL-MCNC: 168 MG/DL — SIGNIFICANT CHANGE UP (ref 70–199)

## 2019-02-25 DIAGNOSIS — Z34.02 ENCOUNTER FOR SUPERVISION OF NORMAL FIRST PREGNANCY, SECOND TRIMESTER: ICD-10-CM

## 2019-02-28 ENCOUNTER — OUTPATIENT (OUTPATIENT)
Dept: OUTPATIENT SERVICES | Facility: HOSPITAL | Age: 29
LOS: 1 days | End: 2019-02-28
Payer: MEDICAID

## 2019-02-28 DIAGNOSIS — Z34.90 ENCOUNTER FOR SUPERVISION OF NORMAL PREGNANCY, UNSPECIFIED, UNSPECIFIED TRIMESTER: ICD-10-CM

## 2019-02-28 PROCEDURE — 82951 GLUCOSE TOLERANCE TEST (GTT): CPT

## 2019-02-28 PROCEDURE — 36415 COLL VENOUS BLD VENIPUNCTURE: CPT

## 2019-02-28 PROCEDURE — 83036 HEMOGLOBIN GLYCOSYLATED A1C: CPT

## 2019-02-28 PROCEDURE — 82952 GTT-ADDED SAMPLES: CPT

## 2019-03-01 LAB
GESTATIONAL GTT PNL UR+SERPL: 86 MG/DL — SIGNIFICANT CHANGE UP (ref 70–94)
GTT GEST 2H PNL UR+SERPL: 177 MG/DL — HIGH (ref 70–154)
HBA1C BLD-MCNC: 5.5 % — SIGNIFICANT CHANGE UP (ref 4–5.6)

## 2019-03-03 LAB
GLUCOSE 1H P CHAL SERPL-MCNC: 212 MG/DL — HIGH (ref 70–179)
GTT GEST 3H PNL SERPL: 145 MG/DL — HIGH (ref 70–139)

## 2019-03-04 ENCOUNTER — MED ADMIN CHARGE (OUTPATIENT)
Age: 29
End: 2019-03-04

## 2019-03-12 ENCOUNTER — APPOINTMENT (OUTPATIENT)
Dept: ANTEPARTUM | Facility: CLINIC | Age: 29
End: 2019-03-12
Payer: MEDICAID

## 2019-03-12 ENCOUNTER — ASOB RESULT (OUTPATIENT)
Age: 29
End: 2019-03-12

## 2019-03-12 PROCEDURE — G0108 DIAB MANAGE TRN  PER INDIV: CPT

## 2019-03-14 ENCOUNTER — NON-APPOINTMENT (OUTPATIENT)
Age: 29
End: 2019-03-14

## 2019-03-14 ENCOUNTER — APPOINTMENT (OUTPATIENT)
Dept: OBGYN | Facility: CLINIC | Age: 29
End: 2019-03-14
Payer: MEDICAID

## 2019-03-14 ENCOUNTER — OUTPATIENT (OUTPATIENT)
Dept: OUTPATIENT SERVICES | Facility: HOSPITAL | Age: 29
LOS: 1 days | End: 2019-03-14
Payer: MEDICAID

## 2019-03-14 VITALS
SYSTOLIC BLOOD PRESSURE: 107 MMHG | DIASTOLIC BLOOD PRESSURE: 73 MMHG | WEIGHT: 150 LBS | BODY MASS INDEX: 30.24 KG/M2 | HEIGHT: 59 IN

## 2019-03-14 DIAGNOSIS — Z34.00 ENCOUNTER FOR SUPERVISION OF NORMAL FIRST PREGNANCY, UNSPECIFIED TRIMESTER: ICD-10-CM

## 2019-03-14 PROCEDURE — 99213 OFFICE O/P EST LOW 20 MIN: CPT

## 2019-03-14 PROCEDURE — 81003 URINALYSIS AUTO W/O SCOPE: CPT

## 2019-03-14 PROCEDURE — G0463: CPT

## 2019-03-18 DIAGNOSIS — Z34.02 ENCOUNTER FOR SUPERVISION OF NORMAL FIRST PREGNANCY, SECOND TRIMESTER: ICD-10-CM

## 2019-03-18 DIAGNOSIS — O24.419 GESTATIONAL DIABETES MELLITUS IN PREGNANCY, UNSPECIFIED CONTROL: ICD-10-CM

## 2019-03-21 ENCOUNTER — ASOB RESULT (OUTPATIENT)
Age: 29
End: 2019-03-21

## 2019-03-21 ENCOUNTER — APPOINTMENT (OUTPATIENT)
Dept: ANTEPARTUM | Facility: CLINIC | Age: 29
End: 2019-03-21
Payer: MEDICAID

## 2019-03-21 PROCEDURE — G0108 DIAB MANAGE TRN  PER INDIV: CPT

## 2019-03-25 ENCOUNTER — APPOINTMENT (OUTPATIENT)
Dept: ANTEPARTUM | Facility: CLINIC | Age: 29
End: 2019-03-25
Payer: MEDICAID

## 2019-03-25 ENCOUNTER — ASOB RESULT (OUTPATIENT)
Age: 29
End: 2019-03-25

## 2019-03-25 PROCEDURE — 76816 OB US FOLLOW-UP PER FETUS: CPT

## 2019-03-28 ENCOUNTER — NON-APPOINTMENT (OUTPATIENT)
Age: 29
End: 2019-03-28

## 2019-03-28 ENCOUNTER — APPOINTMENT (OUTPATIENT)
Dept: OBGYN | Facility: CLINIC | Age: 29
End: 2019-03-28
Payer: MEDICAID

## 2019-03-28 ENCOUNTER — OUTPATIENT (OUTPATIENT)
Dept: OUTPATIENT SERVICES | Facility: HOSPITAL | Age: 29
LOS: 1 days | End: 2019-03-28
Payer: MEDICAID

## 2019-03-28 ENCOUNTER — ASOB RESULT (OUTPATIENT)
Age: 29
End: 2019-03-28

## 2019-03-28 ENCOUNTER — APPOINTMENT (OUTPATIENT)
Dept: ANTEPARTUM | Facility: CLINIC | Age: 29
End: 2019-03-28
Payer: MEDICAID

## 2019-03-28 VITALS
DIASTOLIC BLOOD PRESSURE: 73 MMHG | HEIGHT: 59 IN | SYSTOLIC BLOOD PRESSURE: 107 MMHG | WEIGHT: 150 LBS | BODY MASS INDEX: 30.24 KG/M2

## 2019-03-28 DIAGNOSIS — Z34.00 ENCOUNTER FOR SUPERVISION OF NORMAL FIRST PREGNANCY, UNSPECIFIED TRIMESTER: ICD-10-CM

## 2019-03-28 PROCEDURE — 81003 URINALYSIS AUTO W/O SCOPE: CPT

## 2019-03-28 PROCEDURE — G0463: CPT

## 2019-03-28 PROCEDURE — 99213 OFFICE O/P EST LOW 20 MIN: CPT | Mod: NC

## 2019-03-28 PROCEDURE — G0108 DIAB MANAGE TRN  PER INDIV: CPT

## 2019-03-28 PROCEDURE — 81099 UNLISTED URINALYSIS PX: CPT | Mod: NC

## 2019-03-29 DIAGNOSIS — Z3A.31 31 WEEKS GESTATION OF PREGNANCY: ICD-10-CM

## 2019-03-29 DIAGNOSIS — Z34.02 ENCOUNTER FOR SUPERVISION OF NORMAL FIRST PREGNANCY, SECOND TRIMESTER: ICD-10-CM

## 2019-03-29 DIAGNOSIS — F95.9 TIC DISORDER, UNSPECIFIED: ICD-10-CM

## 2019-04-04 ENCOUNTER — APPOINTMENT (OUTPATIENT)
Dept: ANTEPARTUM | Facility: CLINIC | Age: 29
End: 2019-04-04
Payer: MEDICAID

## 2019-04-04 ENCOUNTER — ASOB RESULT (OUTPATIENT)
Age: 29
End: 2019-04-04

## 2019-04-04 PROCEDURE — G0108 DIAB MANAGE TRN  PER INDIV: CPT

## 2019-04-11 ENCOUNTER — NON-APPOINTMENT (OUTPATIENT)
Age: 29
End: 2019-04-11

## 2019-04-11 ENCOUNTER — APPOINTMENT (OUTPATIENT)
Dept: OBGYN | Facility: CLINIC | Age: 29
End: 2019-04-11
Payer: MEDICAID

## 2019-04-11 ENCOUNTER — OUTPATIENT (OUTPATIENT)
Dept: OUTPATIENT SERVICES | Facility: HOSPITAL | Age: 29
LOS: 1 days | End: 2019-04-11
Payer: MEDICAID

## 2019-04-11 VITALS
SYSTOLIC BLOOD PRESSURE: 106 MMHG | DIASTOLIC BLOOD PRESSURE: 70 MMHG | WEIGHT: 152 LBS | BODY MASS INDEX: 30.64 KG/M2 | HEIGHT: 59 IN

## 2019-04-11 DIAGNOSIS — Z34.00 ENCOUNTER FOR SUPERVISION OF NORMAL FIRST PREGNANCY, UNSPECIFIED TRIMESTER: ICD-10-CM

## 2019-04-11 PROCEDURE — 36415 COLL VENOUS BLD VENIPUNCTURE: CPT

## 2019-04-11 PROCEDURE — 81003 URINALYSIS AUTO W/O SCOPE: CPT

## 2019-04-11 PROCEDURE — 99213 OFFICE O/P EST LOW 20 MIN: CPT | Mod: NC

## 2019-04-11 PROCEDURE — 80307 DRUG TEST PRSMV CHEM ANLYZR: CPT

## 2019-04-11 PROCEDURE — G0463: CPT

## 2019-04-11 PROCEDURE — 86735 MUMPS ANTIBODY: CPT

## 2019-04-11 PROCEDURE — 86765 RUBEOLA ANTIBODY: CPT

## 2019-04-11 PROCEDURE — 81099 UNLISTED URINALYSIS PX: CPT | Mod: NC

## 2019-04-12 LAB
MEV IGG SER-ACNC: 241 AU/ML — SIGNIFICANT CHANGE UP
MEV IGG+IGM SER-IMP: POSITIVE — SIGNIFICANT CHANGE UP
MUV AB SER-ACNC: POSITIVE — SIGNIFICANT CHANGE UP
MUV IGG FLD-ACNC: >300 AU/ML — SIGNIFICANT CHANGE UP

## 2019-04-14 LAB
AMPHET UR-MCNC: NEGATIVE — SIGNIFICANT CHANGE UP
BARBITURATES, URINE.: NEGATIVE — SIGNIFICANT CHANGE UP
BENZODIAZ UR-MCNC: NEGATIVE — SIGNIFICANT CHANGE UP
COCAINE METAB.OTHER UR-MCNC: NEGATIVE — SIGNIFICANT CHANGE UP
CREATININE, URINE THERAPEUTIC: 28.9 MG/DL — SIGNIFICANT CHANGE UP
METHADONE UR-MCNC: NEGATIVE — SIGNIFICANT CHANGE UP
METHAQUALONE UR QL: NEGATIVE — SIGNIFICANT CHANGE UP
METHAQUALONE UR-MCNC: NEGATIVE — SIGNIFICANT CHANGE UP
OPIATES UR-MCNC: NEGATIVE — SIGNIFICANT CHANGE UP
PCP UR-MCNC: NEGATIVE — SIGNIFICANT CHANGE UP
PROPOXYPH UR QL: NEGATIVE — SIGNIFICANT CHANGE UP
THC UR QL: NEGATIVE — SIGNIFICANT CHANGE UP

## 2019-04-15 DIAGNOSIS — Z34.02 ENCOUNTER FOR SUPERVISION OF NORMAL FIRST PREGNANCY, SECOND TRIMESTER: ICD-10-CM

## 2019-04-15 DIAGNOSIS — Z3A.33 33 WEEKS GESTATION OF PREGNANCY: ICD-10-CM

## 2019-04-15 DIAGNOSIS — O24.419 GESTATIONAL DIABETES MELLITUS IN PREGNANCY, UNSPECIFIED CONTROL: ICD-10-CM

## 2019-04-18 ENCOUNTER — APPOINTMENT (OUTPATIENT)
Dept: ANTEPARTUM | Facility: CLINIC | Age: 29
End: 2019-04-18
Payer: MEDICAID

## 2019-04-18 ENCOUNTER — ASOB RESULT (OUTPATIENT)
Age: 29
End: 2019-04-18

## 2019-04-18 PROCEDURE — 76816 OB US FOLLOW-UP PER FETUS: CPT

## 2019-04-18 PROCEDURE — G0108 DIAB MANAGE TRN  PER INDIV: CPT

## 2019-04-18 PROCEDURE — 76818 FETAL BIOPHYS PROFILE W/NST: CPT

## 2019-04-25 ENCOUNTER — APPOINTMENT (OUTPATIENT)
Dept: ANTEPARTUM | Facility: CLINIC | Age: 29
End: 2019-04-25
Payer: MEDICAID

## 2019-04-25 ENCOUNTER — NON-APPOINTMENT (OUTPATIENT)
Age: 29
End: 2019-04-25

## 2019-04-25 ENCOUNTER — ASOB RESULT (OUTPATIENT)
Age: 29
End: 2019-04-25

## 2019-04-25 ENCOUNTER — OUTPATIENT (OUTPATIENT)
Dept: OUTPATIENT SERVICES | Facility: HOSPITAL | Age: 29
LOS: 1 days | End: 2019-04-25
Payer: MEDICAID

## 2019-04-25 ENCOUNTER — APPOINTMENT (OUTPATIENT)
Dept: OBGYN | Facility: CLINIC | Age: 29
End: 2019-04-25
Payer: MEDICAID

## 2019-04-25 VITALS
SYSTOLIC BLOOD PRESSURE: 94 MMHG | BODY MASS INDEX: 31.04 KG/M2 | HEIGHT: 59 IN | DIASTOLIC BLOOD PRESSURE: 62 MMHG | WEIGHT: 154 LBS

## 2019-04-25 DIAGNOSIS — Z34.00 ENCOUNTER FOR SUPERVISION OF NORMAL FIRST PREGNANCY, UNSPECIFIED TRIMESTER: ICD-10-CM

## 2019-04-25 PROCEDURE — 76818 FETAL BIOPHYS PROFILE W/NST: CPT

## 2019-04-25 PROCEDURE — G0463: CPT

## 2019-04-25 PROCEDURE — 81099 UNLISTED URINALYSIS PX: CPT | Mod: NC

## 2019-04-25 PROCEDURE — 99213 OFFICE O/P EST LOW 20 MIN: CPT | Mod: NC

## 2019-04-25 PROCEDURE — 81003 URINALYSIS AUTO W/O SCOPE: CPT

## 2019-04-25 NOTE — ED PROVIDER NOTE - SCRIBE NAME
[facility]     Hafsa Barron  1971  [phone]    VISIT DATE:  2019     PCP: Jocy Snow MD  [address]  [facility] [address]    IDENTIFICATION: A 47 y.o. female from [facility]    CC:  Chief Complaint   Patient presents with   • Hypertension       PROBLEM LIST:  1. HTN  2. HLD  1. 17 lipids: , , HDL 66, , ASCVD 10 year risk 1.9%  2. 3  TG 95 HDL 43 LDL 67  3. DM II  1. 10/4/17 A1c 9.4  4. Carotid bruit  1. 10/27/2017 CUS WNL  5. Hv L subclavian artery thrombosis  1. S/p stent [facility] 2018  6. 2018 Hypercoagulability w/u Dr. Borjas, rec indefinite DAPT  7. Hx CVA  1. , [facility], [facility], mild residual L arm weakness, started on DAPT  8. Obesity  9. Allergic rhinitis  10. Muscular dystrophy   11. Fibromyalgia  12.   13. Surgical Hx:  1. Cholecystectomy     Allergies  Allergies   Allergen Reactions   • Penicillins Anaphylaxis and Shortness Of Breath       Current Medications    Current Outpatient Medications:   •  albuterol (PROAIR HFA) 108 (90 BASE) MCG/ACT inhaler, Inhale 1 puff Every 6 (Six) Hours As Needed for Wheezing., Disp: 90 inhaler, Rfl: 2  •  albuterol (PROVENTIL) (2.5 MG/3ML) 0.083% nebulizer solution, Take 2.5 mg by nebulization Every 4 (Four) Hours As Needed for Wheezing., Disp: 90 vial, Rfl: 1  •  amLODIPine (NORVASC) 2.5 MG tablet, Take 1 tablet by mouth Daily., Disp: 90 tablet, Rfl: 1  •  atorvastatin (LIPITOR) 40 MG tablet, Take 1 tablet by mouth Daily., Disp: 30 tablet, Rfl: 2  •  baclofen (LIORESAL) 10 MG tablet, Take 1 tablet by mouth 3 (Three) Times a Day., Disp: 270 tablet, Rfl: 1  •  Blood Glucose Monitoring Suppl (ONE TOUCH ULTRA 2) w/Device kit, , Disp: , Rfl:   •  busPIRone (BUSPAR) 15 MG tablet, TAKE 1 TABLET THREE TIMES A DAY, Disp: 270 tablet, Rfl: 1  •  calcium carbonate-vitamin d (CALCIUM 600+D HIGH POTENCY) 600-400 MG-UNIT per tablet, Take 1 tablet  by mouth Daily., Disp: 90 tablet, Rfl: 2  •  cyclobenzaprine (FLEXERIL) 10 MG tablet, Take 1 tablet by mouth 3 (Three) Times a Day As Needed for Muscle Spasms., Disp: 270 tablet, Rfl: 2  •  Dulaglutide 0.75 MG/0.5ML solution pen-injector, Inject  under the skin into the appropriate area as directed., Disp: , Rfl:   •  fenofibrate (TRICOR) 145 MG tablet, TAKE 1 TABLET DAILY, Disp: 90 tablet, Rfl: 2  •  fluticasone (FLONASE ALLERGY RELIEF) 50 MCG/ACT nasal spray, into each nostril., Disp: , Rfl:   •  gabapentin (NEURONTIN) 600 MG tablet, TAKE 1 TABLET BY MOUTH THREE TIMES DAILY, Disp: 270 tablet, Rfl: 0  •  HYDROcodone-acetaminophen (NORCO)  MG per tablet, Take 1 tablet by mouth Every 6 (Six) Hours As Needed for Moderate Pain ., Disp: 120 tablet, Rfl: 0  •  KLOR-CON 20 MEQ CR tablet, TAKE 1 TABLET BY MOUTH ONCE DAILY, Disp: 90 tablet, Rfl: 2  •  loratadine (CLARITIN) 10 MG tablet, Take 1 tablet by mouth Daily., Disp: 30 tablet, Rfl: 11  •  metFORMIN (GLUCOPHAGE) 1000 MG tablet, TAKE 1 TABLET TWICE A DAY WITH MEALS, Disp: 180 tablet, Rfl: 2  •  montelukast (SINGULAIR) 10 MG tablet, TAKE 1 TABLET BY MOUTH ONCE DAILY IN THE EVENING, Disp: 90 tablet, Rfl: 1  •  montelukast (SINGULAIR) 10 MG tablet, Take 1 tablet by mouth Every Night., Disp: 90 tablet, Rfl: 1  •  ONE TOUCH ULTRA TEST test strip, , Disp: , Rfl:   •  pantoprazole (PROTONIX) 40 MG EC tablet, Take 1 tablet by mouth Daily., Disp: 90 tablet, Rfl: 1  •  potassium chloride (K-DUR,KLOR-CON) 20 MEQ CR tablet, Take 1 tablet by mouth Daily., Disp: 90 tablet, Rfl: 1  •  SITagliptin (JANUVIA) 100 MG tablet, Take 1 tablet by mouth Daily., Disp: 30 tablet, Rfl: 2  •  spironolactone (ALDACTONE) 50 MG tablet, Take 1 tablet by mouth 2 (Two) Times a Day., Disp: 180 tablet, Rfl: 1  •  tiZANidine (ZANAFLEX) 4 MG tablet, Take 1 tablet by mouth 3 (Three) Times a Day., Disp: 270 tablet, Rfl: 2  •  venlafaxine XR (EFFEXOR XR) 37.5 MG 24 hr capsule, Take 1 capsule by mouth  Daily., Disp: 30 capsule, Rfl: 2  •  aspirin 81 MG EC tablet, Take 81 mg by mouth Daily., Disp: , Rfl:   •  clopidogrel (PLAVIX) 75 MG tablet, TAKE 1 TABLET DAILY, Disp: 90 tablet, Rfl: 2  •  diclofenac (VOLTAREN) 75 MG EC tablet, Take 1 tablet by mouth 2 (Two) Times a Day., Disp: 180 tablet, Rfl: 2     History of Present Illness   HPI  This is a 45-year-old  female here for a belated follow up.     She was evaluated in the [facility] ED in 2018 for ischemic L fingers, was transferred to [facility] and found to have L subclavian arterial thrombosis, was stented per Dr. Grace. Had hypercoagulability w/u and Dr. Borjas agreed she needed to be on lifelong DAPT.     She is scheduled for gastric sleeve on Monday w Dr. Mcdermott at [facility]. She had clearance from Dr. Grace.     Pt denies any chest pain, dyspnea at rest, orthopnea, PND, palpitations, lower extremity edema, or claudication.  She has limited mobility d/t muscular dystrophy. BGs have been 140-180 on average. A1c was recently 6.9. BPs have been controled. Was started on atorvastatin following her stent.  Does have stable dyspnea on exertion.       ROS  Review of Systems   Constitution: Positive for malaise/fatigue.   Cardiovascular: Positive for dyspnea on exertion and paroxysmal nocturnal dyspnea. Negative for chest pain and palpitations.   Respiratory: Positive for snoring.    Musculoskeletal: Positive for arthritis, joint pain, muscle cramps, muscle weakness and myalgias.   All other systems reviewed and are negative.      SOCIAL HX  Social History     Socioeconomic History   • Marital status:      Spouse name: Not on file   • Number of children: Not on file   • Years of education: Not on file   • Highest education level: Not on file   Tobacco Use   • Smoking status: Former Smoker     Packs/day: 1.50     Years: 15.00     Pack years: 22.50     Start date: 1983     Last attempt to quit: 2013     Years since quittin.6   • Smokeless  "tobacco: Never Used   • Tobacco comment: use Ecigg now NO NICOTENE   Substance and Sexual Activity   • Alcohol use: Yes     Frequency: Monthly or less   • Drug use: No   • Sexual activity: Yes     Partners: Male     Birth control/protection: Post-menopausal     Comment: Menapause no cycle since 2013       FAMILY HX  Family History   Problem Relation Age of Onset   • Allergies Other    • ADD / ADHD Other    • Heart block Other    • Other Other         CEREBROVASCULAR ACCIDENT    • Hypertension Other    • Cancer Other         LUNG CANCER   • Hyperlipidemia Other    • Alcohol abuse Mother    • Depression Mother    • Early death Mother          age 59   • Heart disease Mother         Mother  of Massive Heart attack   • Hyperlipidemia Mother    • Hypertension Mother    • Miscarriages / Stillbirths Mother         Miscarriage   • Heart attack Mother          of  massive heart attack   • Alcohol abuse Father    • Cancer Father         Had Lung Cancer - had 1 lung till death   • Diabetes Father         Lived on insuline shots   • Early death Father          age 53   • Breast cancer Maternal Aunt    • Learning disabilities Son         Had Adhd   • Ovarian cancer Neg Hx    • Endometrial cancer Neg Hx    • Uterine cancer Neg Hx    • Colon cancer Neg Hx        Vitals:    19 1414   BP: 138/70   BP Location: Right arm   Patient Position: Sitting   Pulse: 110   SpO2: 95%   Weight: 125 kg (275 lb 12.8 oz)   Height: 165.1 cm (65\")       PHYSICAL EXAMINATION:  Physical Exam   Constitutional: She is oriented to person, place, and time. She appears well-developed and well-nourished. No distress.   obese   HENT:   Head: Normocephalic and atraumatic.   Right Ear: External ear normal.   Left Ear: External ear normal.   Nose: Nose normal.   Eyes: Conjunctivae and EOM are normal.   Neck: Neck supple. No hepatojugular reflux and no JVD present. Carotid bruit is not present. No thyromegaly present.   L sided " carotid bruit   Cardiovascular: Regular rhythm, S1 normal, S2 normal, normal heart sounds, intact distal pulses and normal pulses. Tachycardia present. Exam reveals no gallop, no distant heart sounds and no midsystolic click.   No murmur heard.  Pulses:       Radial pulses are 2+ on the right side, and 2+ on the left side.        Dorsalis pedis pulses are 2+ on the right side, and 2+ on the left side.        Posterior tibial pulses are 2+ on the right side, and 2+ on the left side.   Pulmonary/Chest: Effort normal and breath sounds normal. No respiratory distress. She has no decreased breath sounds. She has no wheezes. She has no rhonchi. She has no rales.   Abdominal: Soft. Bowel sounds are normal. There is no hepatosplenomegaly. There is no tenderness.   Musculoskeletal: Normal range of motion. She exhibits edema.   Neurological: She is alert and oriented to person, place, and time.   No focal deficits.   Skin: Skin is warm and dry. No erythema.   Psychiatric: She has a normal mood and affect. Thought content normal.   Nursing note and vitals reviewed.      Diagnostic Data:  Procedures  Lab Results   Component Value Date    TRIG 95 03/13/2019    HDL 43 03/13/2019     Lab Results   Component Value Date    GLUCOSE 166 (H) 03/13/2019    BUN 18 03/13/2019    CREATININE 0.54 (L) 03/13/2019     03/13/2019    K 3.9 03/13/2019     03/13/2019    CO2 29.0 03/13/2019     Lab Results   Component Value Date    HGBA1C 6.9 02/19/2019     Lab Results   Component Value Date    WBC 6.92 03/13/2019    HGB 13.8 03/13/2019    HCT 43.6 03/13/2019     03/13/2019       ASSESSMENT:   Diagnosis Plan   1. Essential hypertension     2. Hyperlipidemia, unspecified hyperlipidemia type     3. Type 2 diabetes mellitus without complication, without long-term current use of insulin (CMS/[facility])     4. Bruit of left carotid artery         PLAN:  1. HTN well controlled, continue antihypertensives  2. Lipids slightly elevated on  fenofibrate  3. DM: pt counseled on the importance of tight glycemic control to prevent complications of diabetes on multiple body symptoms. Pt counseled that cardiac risk goes up with a1c above 7. Pt counseled to avoid high carb foods such as bread, pasta, potatoes, sweets.   4. Carotid duplex was WNL.  Continue to monitor.  5. Following subclavian arterial thrombosis, pt to remain on DAPT indefinitely per vascular and heme/onc. Is holding currently for her sleeve gastrectomy scheduled on Monday.      Will see pt in 1 year.      Scribed for Robert Peralta MD by Patsy Ferrari PA-C. 4/26/2019  3:11 PM  I, Robert Peralta MD, personally performed the services described in this documentation as scribed by the above named individual in my presence, and it is both accurate and complete.  4/26/2019  3:13 PM    Robert Peralta MD, [facility]        Mary Bee

## 2019-04-26 DIAGNOSIS — F95.9 TIC DISORDER, UNSPECIFIED: ICD-10-CM

## 2019-04-26 DIAGNOSIS — R00.0 TACHYCARDIA, UNSPECIFIED: ICD-10-CM

## 2019-04-26 DIAGNOSIS — Z34.93 ENCOUNTER FOR SUPERVISION OF NORMAL PREGNANCY, UNSPECIFIED, THIRD TRIMESTER: ICD-10-CM

## 2019-04-26 DIAGNOSIS — Z3A.33 33 WEEKS GESTATION OF PREGNANCY: ICD-10-CM

## 2019-05-02 ENCOUNTER — ASOB RESULT (OUTPATIENT)
Age: 29
End: 2019-05-02

## 2019-05-02 ENCOUNTER — APPOINTMENT (OUTPATIENT)
Dept: ANTEPARTUM | Facility: CLINIC | Age: 29
End: 2019-05-02
Payer: MEDICAID

## 2019-05-02 PROCEDURE — 76818 FETAL BIOPHYS PROFILE W/NST: CPT

## 2019-05-02 PROCEDURE — G0108 DIAB MANAGE TRN  PER INDIV: CPT

## 2019-05-03 ENCOUNTER — APPOINTMENT (OUTPATIENT)
Dept: OBGYN | Facility: CLINIC | Age: 29
End: 2019-05-03
Payer: MEDICAID

## 2019-05-03 ENCOUNTER — NON-APPOINTMENT (OUTPATIENT)
Age: 29
End: 2019-05-03

## 2019-05-03 ENCOUNTER — OUTPATIENT (OUTPATIENT)
Dept: OUTPATIENT SERVICES | Facility: HOSPITAL | Age: 29
LOS: 1 days | End: 2019-05-03
Payer: MEDICAID

## 2019-05-03 VITALS
WEIGHT: 155 LBS | BODY MASS INDEX: 31.25 KG/M2 | SYSTOLIC BLOOD PRESSURE: 109 MMHG | HEIGHT: 59 IN | DIASTOLIC BLOOD PRESSURE: 73 MMHG

## 2019-05-03 DIAGNOSIS — Z34.02 ENCOUNTER FOR SUPERVISION OF NORMAL FIRST PREGNANCY, SECOND TRIMESTER: ICD-10-CM

## 2019-05-03 DIAGNOSIS — Z34.00 ENCOUNTER FOR SUPERVISION OF NORMAL FIRST PREGNANCY, UNSPECIFIED TRIMESTER: ICD-10-CM

## 2019-05-03 LAB
HCV AB S/CO SERPL IA: 0.04 S/CO — SIGNIFICANT CHANGE UP (ref 0–0.99)
HCV AB SERPL-IMP: SIGNIFICANT CHANGE UP
HIV 1+2 AB+HIV1 P24 AG SERPL QL IA: SIGNIFICANT CHANGE UP

## 2019-05-03 PROCEDURE — 99213 OFFICE O/P EST LOW 20 MIN: CPT | Mod: NC

## 2019-05-03 PROCEDURE — 87653 STREP B DNA AMP PROBE: CPT

## 2019-05-03 PROCEDURE — 86780 TREPONEMA PALLIDUM: CPT

## 2019-05-03 PROCEDURE — G0463: CPT

## 2019-05-03 PROCEDURE — 81003 URINALYSIS AUTO W/O SCOPE: CPT

## 2019-05-03 PROCEDURE — 86803 HEPATITIS C AB TEST: CPT

## 2019-05-03 PROCEDURE — 85027 COMPLETE CBC AUTOMATED: CPT

## 2019-05-03 PROCEDURE — 87491 CHLMYD TRACH DNA AMP PROBE: CPT

## 2019-05-03 PROCEDURE — 87591 N.GONORRHOEAE DNA AMP PROB: CPT

## 2019-05-03 PROCEDURE — 87389 HIV-1 AG W/HIV-1&-2 AB AG IA: CPT

## 2019-05-03 PROCEDURE — 81099 UNLISTED URINALYSIS PX: CPT | Mod: NC

## 2019-05-04 LAB
BASOPHILS # BLD AUTO: 0.03 K/UL — SIGNIFICANT CHANGE UP (ref 0–0.2)
BASOPHILS NFR BLD AUTO: 0.3 % — SIGNIFICANT CHANGE UP (ref 0–2)
C TRACH RRNA SPEC QL NAA+PROBE: SIGNIFICANT CHANGE UP
EOSINOPHIL # BLD AUTO: 0.03 K/UL — SIGNIFICANT CHANGE UP (ref 0–0.5)
EOSINOPHIL NFR BLD AUTO: 0.3 % — SIGNIFICANT CHANGE UP (ref 0–6)
GROUP B BETA STREP DNA (PCR): SIGNIFICANT CHANGE UP
GROUP B BETA STREP INTERPRETATION: SIGNIFICANT CHANGE UP
HCT VFR BLD CALC: 36.2 % — SIGNIFICANT CHANGE UP (ref 34.5–45)
HGB BLD-MCNC: 10.5 G/DL — LOW (ref 11.5–15.5)
IMM GRANULOCYTES NFR BLD AUTO: 1.4 % — SIGNIFICANT CHANGE UP (ref 0–1.5)
LYMPHOCYTES # BLD AUTO: 1.77 K/UL — SIGNIFICANT CHANGE UP (ref 1–3.3)
LYMPHOCYTES # BLD AUTO: 17.9 % — SIGNIFICANT CHANGE UP (ref 13–44)
MCHC RBC-ENTMCNC: 28.5 PG — SIGNIFICANT CHANGE UP (ref 27–34)
MCHC RBC-ENTMCNC: 29 GM/DL — LOW (ref 32–36)
MCV RBC AUTO: 98.1 FL — SIGNIFICANT CHANGE UP (ref 80–100)
MONOCYTES # BLD AUTO: 0.72 K/UL — SIGNIFICANT CHANGE UP (ref 0–0.9)
MONOCYTES NFR BLD AUTO: 7.3 % — SIGNIFICANT CHANGE UP (ref 2–14)
N GONORRHOEA RRNA SPEC QL NAA+PROBE: SIGNIFICANT CHANGE UP
NEUTROPHILS # BLD AUTO: 7.21 K/UL — SIGNIFICANT CHANGE UP (ref 1.8–7.4)
NEUTROPHILS NFR BLD AUTO: 72.8 % — SIGNIFICANT CHANGE UP (ref 43–77)
PLATELET # BLD AUTO: 197 K/UL — SIGNIFICANT CHANGE UP (ref 150–400)
RBC # BLD: 3.69 M/UL — LOW (ref 3.8–5.2)
RBC # FLD: 14.7 % — HIGH (ref 10.3–14.5)
SOURCE GROUP B STREP: SIGNIFICANT CHANGE UP
SPECIMEN SOURCE: SIGNIFICANT CHANGE UP
T PALLIDUM AB TITR SER: NEGATIVE — SIGNIFICANT CHANGE UP
WBC # BLD: 9.9 K/UL — SIGNIFICANT CHANGE UP (ref 3.8–10.5)
WBC # FLD AUTO: 9.9 K/UL — SIGNIFICANT CHANGE UP (ref 3.8–10.5)

## 2019-05-06 DIAGNOSIS — Z34.93 ENCOUNTER FOR SUPERVISION OF NORMAL PREGNANCY, UNSPECIFIED, THIRD TRIMESTER: ICD-10-CM

## 2019-05-06 DIAGNOSIS — F95.9 TIC DISORDER, UNSPECIFIED: ICD-10-CM

## 2019-05-06 DIAGNOSIS — Z3A.36 36 WEEKS GESTATION OF PREGNANCY: ICD-10-CM

## 2019-05-09 ENCOUNTER — APPOINTMENT (OUTPATIENT)
Dept: ANTEPARTUM | Facility: CLINIC | Age: 29
End: 2019-05-09
Payer: MEDICAID

## 2019-05-09 ENCOUNTER — NON-APPOINTMENT (OUTPATIENT)
Age: 29
End: 2019-05-09

## 2019-05-09 ENCOUNTER — OUTPATIENT (OUTPATIENT)
Dept: OUTPATIENT SERVICES | Facility: HOSPITAL | Age: 29
LOS: 1 days | End: 2019-05-09
Payer: MEDICAID

## 2019-05-09 ENCOUNTER — ASOB RESULT (OUTPATIENT)
Age: 29
End: 2019-05-09

## 2019-05-09 ENCOUNTER — APPOINTMENT (OUTPATIENT)
Dept: OBGYN | Facility: CLINIC | Age: 29
End: 2019-05-09
Payer: MEDICAID

## 2019-05-09 VITALS
SYSTOLIC BLOOD PRESSURE: 94 MMHG | BODY MASS INDEX: 31.85 KG/M2 | HEIGHT: 59 IN | DIASTOLIC BLOOD PRESSURE: 68 MMHG | WEIGHT: 158 LBS

## 2019-05-09 VITALS — HEART RATE: 110 BPM

## 2019-05-09 DIAGNOSIS — Z34.00 ENCOUNTER FOR SUPERVISION OF NORMAL FIRST PREGNANCY, UNSPECIFIED TRIMESTER: ICD-10-CM

## 2019-05-09 PROCEDURE — G0463: CPT

## 2019-05-09 PROCEDURE — 76818 FETAL BIOPHYS PROFILE W/NST: CPT

## 2019-05-09 PROCEDURE — 99213 OFFICE O/P EST LOW 20 MIN: CPT | Mod: NC

## 2019-05-09 PROCEDURE — 81003 URINALYSIS AUTO W/O SCOPE: CPT

## 2019-05-09 PROCEDURE — 81099 UNLISTED URINALYSIS PX: CPT | Mod: NC

## 2019-05-10 DIAGNOSIS — O09.292 SUPERVISION OF PREGNANCY WITH OTHER POOR REPRODUCTIVE OR OBSTETRIC HISTORY, SECOND TRIMESTER: ICD-10-CM

## 2019-05-10 DIAGNOSIS — Z34.93 ENCOUNTER FOR SUPERVISION OF NORMAL PREGNANCY, UNSPECIFIED, THIRD TRIMESTER: ICD-10-CM

## 2019-05-10 DIAGNOSIS — R00.0 TACHYCARDIA, UNSPECIFIED: ICD-10-CM

## 2019-05-10 DIAGNOSIS — O24.419 GESTATIONAL DIABETES MELLITUS IN PREGNANCY, UNSPECIFIED CONTROL: ICD-10-CM

## 2019-05-10 DIAGNOSIS — Z3A.36 36 WEEKS GESTATION OF PREGNANCY: ICD-10-CM

## 2019-05-13 ENCOUNTER — OUTPATIENT (OUTPATIENT)
Dept: OUTPATIENT SERVICES | Facility: HOSPITAL | Age: 29
LOS: 1 days | End: 2019-05-13
Payer: MEDICAID

## 2019-05-13 DIAGNOSIS — O26.899 OTHER SPECIFIED PREGNANCY RELATED CONDITIONS, UNSPECIFIED TRIMESTER: ICD-10-CM

## 2019-05-13 DIAGNOSIS — Z3A.00 WEEKS OF GESTATION OF PREGNANCY NOT SPECIFIED: ICD-10-CM

## 2019-05-13 PROCEDURE — 76815 OB US LIMITED FETUS(S): CPT | Mod: 26

## 2019-05-13 PROCEDURE — 76818 FETAL BIOPHYS PROFILE W/NST: CPT | Mod: 26

## 2019-05-13 PROCEDURE — 59025 FETAL NON-STRESS TEST: CPT

## 2019-05-13 PROCEDURE — 76815 OB US LIMITED FETUS(S): CPT

## 2019-05-13 PROCEDURE — G0463: CPT

## 2019-05-13 PROCEDURE — 82962 GLUCOSE BLOOD TEST: CPT

## 2019-05-13 PROCEDURE — 76818 FETAL BIOPHYS PROFILE W/NST: CPT

## 2019-05-16 ENCOUNTER — APPOINTMENT (OUTPATIENT)
Dept: OBGYN | Facility: CLINIC | Age: 29
End: 2019-05-16
Payer: MEDICAID

## 2019-05-16 ENCOUNTER — ASOB RESULT (OUTPATIENT)
Age: 29
End: 2019-05-16

## 2019-05-16 ENCOUNTER — NON-APPOINTMENT (OUTPATIENT)
Age: 29
End: 2019-05-16

## 2019-05-16 ENCOUNTER — APPOINTMENT (OUTPATIENT)
Dept: ANTEPARTUM | Facility: CLINIC | Age: 29
End: 2019-05-16
Payer: MEDICAID

## 2019-05-16 ENCOUNTER — OUTPATIENT (OUTPATIENT)
Dept: OUTPATIENT SERVICES | Facility: HOSPITAL | Age: 29
LOS: 1 days | End: 2019-05-16
Payer: MEDICAID

## 2019-05-16 VITALS
WEIGHT: 157 LBS | DIASTOLIC BLOOD PRESSURE: 66 MMHG | SYSTOLIC BLOOD PRESSURE: 93 MMHG | BODY MASS INDEX: 31.65 KG/M2 | HEIGHT: 59 IN

## 2019-05-16 DIAGNOSIS — Z34.00 ENCOUNTER FOR SUPERVISION OF NORMAL FIRST PREGNANCY, UNSPECIFIED TRIMESTER: ICD-10-CM

## 2019-05-16 PROCEDURE — 76816 OB US FOLLOW-UP PER FETUS: CPT

## 2019-05-16 PROCEDURE — 76818 FETAL BIOPHYS PROFILE W/NST: CPT

## 2019-05-16 PROCEDURE — 99213 OFFICE O/P EST LOW 20 MIN: CPT | Mod: NC

## 2019-05-16 PROCEDURE — G0463: CPT

## 2019-05-16 PROCEDURE — G0108 DIAB MANAGE TRN  PER INDIV: CPT

## 2019-05-16 PROCEDURE — 81003 URINALYSIS AUTO W/O SCOPE: CPT

## 2019-05-16 PROCEDURE — 81099 UNLISTED URINALYSIS PX: CPT | Mod: NC

## 2019-05-17 DIAGNOSIS — O24.419 GESTATIONAL DIABETES MELLITUS IN PREGNANCY, UNSPECIFIED CONTROL: ICD-10-CM

## 2019-05-17 DIAGNOSIS — Z34.93 ENCOUNTER FOR SUPERVISION OF NORMAL PREGNANCY, UNSPECIFIED, THIRD TRIMESTER: ICD-10-CM

## 2019-05-17 DIAGNOSIS — F95.9 TIC DISORDER, UNSPECIFIED: ICD-10-CM

## 2019-05-17 DIAGNOSIS — Z3A.32 32 WEEKS GESTATION OF PREGNANCY: ICD-10-CM

## 2019-05-17 DIAGNOSIS — O09.292 SUPERVISION OF PREGNANCY WITH OTHER POOR REPRODUCTIVE OR OBSTETRIC HISTORY, SECOND TRIMESTER: ICD-10-CM

## 2019-05-17 DIAGNOSIS — R00.0 TACHYCARDIA, UNSPECIFIED: ICD-10-CM

## 2019-05-23 ENCOUNTER — INPATIENT (INPATIENT)
Facility: HOSPITAL | Age: 29
LOS: 3 days | Discharge: HOME | End: 2019-05-27
Attending: OBSTETRICS & GYNECOLOGY | Admitting: OBSTETRICS & GYNECOLOGY
Payer: MEDICAID

## 2019-05-23 ENCOUNTER — APPOINTMENT (OUTPATIENT)
Dept: ANTEPARTUM | Facility: CLINIC | Age: 29
End: 2019-05-23

## 2019-05-23 VITALS — WEIGHT: 293 LBS | HEIGHT: 60 IN

## 2019-05-23 DIAGNOSIS — O26.899 OTHER SPECIFIED PREGNANCY RELATED CONDITIONS, UNSPECIFIED TRIMESTER: ICD-10-CM

## 2019-05-23 DIAGNOSIS — Z3A.00 WEEKS OF GESTATION OF PREGNANCY NOT SPECIFIED: ICD-10-CM

## 2019-05-23 DIAGNOSIS — Z34.80 ENCOUNTER FOR SUPERVISION OF OTHER NORMAL PREGNANCY, UNSPECIFIED TRIMESTER: ICD-10-CM

## 2019-05-23 LAB
APTT BLD: 27.5 SEC — SIGNIFICANT CHANGE UP (ref 27.5–36.3)
BASOPHILS # BLD AUTO: 0.02 K/UL — SIGNIFICANT CHANGE UP (ref 0–0.2)
BASOPHILS NFR BLD AUTO: 0.2 % — SIGNIFICANT CHANGE UP (ref 0–2)
EOSINOPHIL # BLD AUTO: 0.01 K/UL — SIGNIFICANT CHANGE UP (ref 0–0.5)
EOSINOPHIL NFR BLD AUTO: 0.1 % — SIGNIFICANT CHANGE UP (ref 0–6)
FIBRINOGEN PPP-MCNC: 566 MG/DL — HIGH (ref 350–510)
HCT VFR BLD CALC: 33.7 % — LOW (ref 34.5–45)
HGB BLD-MCNC: 10.8 G/DL — LOW (ref 11.5–15.5)
IMM GRANULOCYTES NFR BLD AUTO: 0.8 % — SIGNIFICANT CHANGE UP (ref 0–1.5)
INR BLD: 1.01 RATIO — SIGNIFICANT CHANGE UP (ref 0.88–1.16)
LYMPHOCYTES # BLD AUTO: 1.49 K/UL — SIGNIFICANT CHANGE UP (ref 1–3.3)
LYMPHOCYTES # BLD AUTO: 18 % — SIGNIFICANT CHANGE UP (ref 13–44)
MCHC RBC-ENTMCNC: 28.6 PG — SIGNIFICANT CHANGE UP (ref 27–34)
MCHC RBC-ENTMCNC: 32 GM/DL — SIGNIFICANT CHANGE UP (ref 32–36)
MCV RBC AUTO: 89.2 FL — SIGNIFICANT CHANGE UP (ref 80–100)
MONOCYTES # BLD AUTO: 0.66 K/UL — SIGNIFICANT CHANGE UP (ref 0–0.9)
MONOCYTES NFR BLD AUTO: 8 % — SIGNIFICANT CHANGE UP (ref 2–14)
NEUTROPHILS # BLD AUTO: 6.04 K/UL — SIGNIFICANT CHANGE UP (ref 1.8–7.4)
NEUTROPHILS NFR BLD AUTO: 72.9 % — SIGNIFICANT CHANGE UP (ref 43–77)
NRBC # BLD: 0 /100 WBCS — SIGNIFICANT CHANGE UP (ref 0–0)
PLATELET # BLD AUTO: 198 K/UL — SIGNIFICANT CHANGE UP (ref 150–400)
PROTHROM AB SERPL-ACNC: 11.2 SEC — SIGNIFICANT CHANGE UP (ref 10–12.9)
RBC # BLD: 3.78 M/UL — LOW (ref 3.8–5.2)
RBC # FLD: 14.8 % — HIGH (ref 10.3–14.5)
WBC # BLD: 8.29 K/UL — SIGNIFICANT CHANGE UP (ref 3.8–10.5)
WBC # FLD AUTO: 8.29 K/UL — SIGNIFICANT CHANGE UP (ref 3.8–10.5)

## 2019-05-23 RX ORDER — SODIUM CHLORIDE 9 MG/ML
1000 INJECTION, SOLUTION INTRAVENOUS
Refills: 0 | Status: COMPLETED | OUTPATIENT
Start: 2019-05-23 | End: 2019-05-24

## 2019-05-23 RX ORDER — CITRIC ACID/SODIUM CITRATE 300-500 MG
15 SOLUTION, ORAL ORAL EVERY 6 HOURS
Refills: 0 | Status: DISCONTINUED | OUTPATIENT
Start: 2019-05-23 | End: 2019-05-24

## 2019-05-23 RX ORDER — SODIUM CHLORIDE 9 MG/ML
1000 INJECTION INTRAMUSCULAR; INTRAVENOUS; SUBCUTANEOUS
Refills: 0 | Status: COMPLETED | OUTPATIENT
Start: 2019-05-23 | End: 2019-05-23

## 2019-05-23 RX ORDER — SODIUM CHLORIDE 9 MG/ML
1000 INJECTION, SOLUTION INTRAVENOUS
Refills: 0 | Status: DISCONTINUED | OUTPATIENT
Start: 2019-05-23 | End: 2019-05-27

## 2019-05-23 RX ORDER — OXYTOCIN 10 UNIT/ML
333.33 VIAL (ML) INJECTION
Qty: 20 | Refills: 0 | Status: DISCONTINUED | OUTPATIENT
Start: 2019-05-23 | End: 2019-05-25

## 2019-05-23 RX ADMIN — SODIUM CHLORIDE 125 MILLILITER(S): 9 INJECTION, SOLUTION INTRAVENOUS at 21:30

## 2019-05-23 RX ADMIN — SODIUM CHLORIDE 125 MILLILITER(S): 9 INJECTION INTRAMUSCULAR; INTRAVENOUS; SUBCUTANEOUS at 16:24

## 2019-05-24 ENCOUNTER — RESULT REVIEW (OUTPATIENT)
Age: 29
End: 2019-05-24

## 2019-05-24 LAB — T PALLIDUM AB TITR SER: NEGATIVE — SIGNIFICANT CHANGE UP

## 2019-05-24 RX ORDER — GENTAMICIN SULFATE 40 MG/ML
80 VIAL (ML) INJECTION ONCE
Refills: 0 | Status: COMPLETED | OUTPATIENT
Start: 2019-05-24 | End: 2019-05-24

## 2019-05-24 RX ORDER — AMPICILLIN TRIHYDRATE 250 MG
2 CAPSULE ORAL ONCE
Refills: 0 | Status: COMPLETED | OUTPATIENT
Start: 2019-05-24 | End: 2019-05-24

## 2019-05-24 RX ORDER — GENTAMICIN SULFATE 40 MG/ML
VIAL (ML) INJECTION
Refills: 0 | Status: DISCONTINUED | OUTPATIENT
Start: 2019-05-24 | End: 2019-05-26

## 2019-05-24 RX ORDER — OXYTOCIN 10 UNIT/ML
2 VIAL (ML) INJECTION
Qty: 30 | Refills: 0 | Status: DISCONTINUED | OUTPATIENT
Start: 2019-05-24 | End: 2019-05-25

## 2019-05-24 RX ORDER — GENTAMICIN SULFATE 40 MG/ML
80 VIAL (ML) INJECTION EVERY 8 HOURS
Refills: 0 | Status: DISCONTINUED | OUTPATIENT
Start: 2019-05-25 | End: 2019-05-26

## 2019-05-24 RX ORDER — ACETAMINOPHEN 500 MG
650 TABLET ORAL EVERY 4 HOURS
Refills: 0 | Status: DISCONTINUED | OUTPATIENT
Start: 2019-05-24 | End: 2019-05-27

## 2019-05-24 RX ORDER — SODIUM CHLORIDE 9 MG/ML
500 INJECTION, SOLUTION INTRAVENOUS
Refills: 0 | Status: COMPLETED | OUTPATIENT
Start: 2019-05-24 | End: 2019-05-24

## 2019-05-24 RX ORDER — AMPICILLIN TRIHYDRATE 250 MG
CAPSULE ORAL
Refills: 0 | Status: DISCONTINUED | OUTPATIENT
Start: 2019-05-24 | End: 2019-05-25

## 2019-05-24 RX ORDER — AMPICILLIN TRIHYDRATE 250 MG
1 CAPSULE ORAL EVERY 6 HOURS
Refills: 0 | Status: DISCONTINUED | OUTPATIENT
Start: 2019-05-25 | End: 2019-05-25

## 2019-05-24 RX ADMIN — Medication 650 MILLIGRAM(S): at 21:18

## 2019-05-24 RX ADMIN — Medication 2 MILLIUNIT(S)/MIN: at 16:15

## 2019-05-24 RX ADMIN — Medication 650 MILLIGRAM(S): at 20:43

## 2019-05-24 RX ADMIN — Medication 200 MILLIGRAM(S): at 21:17

## 2019-05-24 RX ADMIN — SODIUM CHLORIDE 125 MILLILITER(S): 9 INJECTION, SOLUTION INTRAVENOUS at 03:30

## 2019-05-24 RX ADMIN — Medication 2 MILLIUNIT(S)/MIN: at 22:00

## 2019-05-24 RX ADMIN — SODIUM CHLORIDE 999 MILLILITER(S): 9 INJECTION, SOLUTION INTRAVENOUS at 09:00

## 2019-05-24 RX ADMIN — Medication 216 GRAM(S): at 20:41

## 2019-05-25 ENCOUNTER — TRANSCRIPTION ENCOUNTER (OUTPATIENT)
Age: 29
End: 2019-05-25

## 2019-05-25 LAB — HBA1C BLD-MCNC: 5.6 % — SIGNIFICANT CHANGE UP (ref 4–5.6)

## 2019-05-25 PROCEDURE — 88307 TISSUE EXAM BY PATHOLOGIST: CPT | Mod: 26

## 2019-05-25 PROCEDURE — 59409 OBSTETRICAL CARE: CPT | Mod: U9

## 2019-05-25 RX ORDER — SODIUM CHLORIDE 9 MG/ML
1000 INJECTION, SOLUTION INTRAVENOUS
Refills: 0 | Status: DISCONTINUED | OUTPATIENT
Start: 2019-05-25 | End: 2019-05-27

## 2019-05-25 RX ORDER — DEXTROSE 50 % IN WATER 50 %
15 SYRINGE (ML) INTRAVENOUS ONCE
Refills: 0 | Status: DISCONTINUED | OUTPATIENT
Start: 2019-05-25 | End: 2019-05-27

## 2019-05-25 RX ORDER — OXYTOCIN 10 UNIT/ML
333.33 VIAL (ML) INJECTION
Qty: 20 | Refills: 0 | Status: DISCONTINUED | OUTPATIENT
Start: 2019-05-25 | End: 2019-05-27

## 2019-05-25 RX ORDER — AER TRAVELER 0.5 G/1
1 SOLUTION RECTAL; TOPICAL EVERY 4 HOURS
Refills: 0 | Status: DISCONTINUED | OUTPATIENT
Start: 2019-05-25 | End: 2019-05-27

## 2019-05-25 RX ORDER — DEXTROSE 50 % IN WATER 50 %
25 SYRINGE (ML) INTRAVENOUS ONCE
Refills: 0 | Status: DISCONTINUED | OUTPATIENT
Start: 2019-05-25 | End: 2019-05-27

## 2019-05-25 RX ORDER — DIBUCAINE 1 %
1 OINTMENT (GRAM) RECTAL EVERY 6 HOURS
Refills: 0 | Status: DISCONTINUED | OUTPATIENT
Start: 2019-05-25 | End: 2019-05-27

## 2019-05-25 RX ORDER — DEXTROSE 50 % IN WATER 50 %
12.5 SYRINGE (ML) INTRAVENOUS ONCE
Refills: 0 | Status: DISCONTINUED | OUTPATIENT
Start: 2019-05-25 | End: 2019-05-27

## 2019-05-25 RX ORDER — PRAMOXINE HYDROCHLORIDE 150 MG/15G
1 AEROSOL, FOAM RECTAL EVERY 4 HOURS
Refills: 0 | Status: DISCONTINUED | OUTPATIENT
Start: 2019-05-25 | End: 2019-05-27

## 2019-05-25 RX ORDER — DIPHENHYDRAMINE HCL 50 MG
25 CAPSULE ORAL EVERY 6 HOURS
Refills: 0 | Status: DISCONTINUED | OUTPATIENT
Start: 2019-05-25 | End: 2019-05-27

## 2019-05-25 RX ORDER — SODIUM CHLORIDE 9 MG/ML
3 INJECTION INTRAMUSCULAR; INTRAVENOUS; SUBCUTANEOUS EVERY 8 HOURS
Refills: 0 | Status: DISCONTINUED | OUTPATIENT
Start: 2019-05-25 | End: 2019-05-27

## 2019-05-25 RX ORDER — GLYCERIN ADULT
1 SUPPOSITORY, RECTAL RECTAL AT BEDTIME
Refills: 0 | Status: DISCONTINUED | OUTPATIENT
Start: 2019-05-25 | End: 2019-05-27

## 2019-05-25 RX ORDER — GLUCAGON INJECTION, SOLUTION 0.5 MG/.1ML
1 INJECTION, SOLUTION SUBCUTANEOUS ONCE
Refills: 0 | Status: DISCONTINUED | OUTPATIENT
Start: 2019-05-25 | End: 2019-05-27

## 2019-05-25 RX ORDER — INSULIN LISPRO 100/ML
VIAL (ML) SUBCUTANEOUS
Refills: 0 | Status: DISCONTINUED | OUTPATIENT
Start: 2019-05-25 | End: 2019-05-27

## 2019-05-25 RX ORDER — IBUPROFEN 200 MG
600 TABLET ORAL EVERY 6 HOURS
Refills: 0 | Status: DISCONTINUED | OUTPATIENT
Start: 2019-05-25 | End: 2019-05-27

## 2019-05-25 RX ORDER — LANOLIN
1 OINTMENT (GRAM) TOPICAL EVERY 6 HOURS
Refills: 0 | Status: DISCONTINUED | OUTPATIENT
Start: 2019-05-25 | End: 2019-05-27

## 2019-05-25 RX ORDER — TETANUS TOXOID, REDUCED DIPHTHERIA TOXOID AND ACELLULAR PERTUSSIS VACCINE, ADSORBED 5; 2.5; 8; 8; 2.5 [IU]/.5ML; [IU]/.5ML; UG/.5ML; UG/.5ML; UG/.5ML
0.5 SUSPENSION INTRAMUSCULAR ONCE
Refills: 0 | Status: DISCONTINUED | OUTPATIENT
Start: 2019-05-25 | End: 2019-05-27

## 2019-05-25 RX ORDER — DOCUSATE SODIUM 100 MG
100 CAPSULE ORAL
Refills: 0 | Status: DISCONTINUED | OUTPATIENT
Start: 2019-05-25 | End: 2019-05-26

## 2019-05-25 RX ORDER — BENZOCAINE 10 %
1 GEL (GRAM) MUCOUS MEMBRANE EVERY 6 HOURS
Refills: 0 | Status: DISCONTINUED | OUTPATIENT
Start: 2019-05-25 | End: 2019-05-27

## 2019-05-25 RX ORDER — MAGNESIUM HYDROXIDE 400 MG/1
30 TABLET, CHEWABLE ORAL
Refills: 0 | Status: DISCONTINUED | OUTPATIENT
Start: 2019-05-25 | End: 2019-05-27

## 2019-05-25 RX ORDER — OXYCODONE AND ACETAMINOPHEN 5; 325 MG/1; MG/1
2 TABLET ORAL EVERY 4 HOURS
Refills: 0 | Status: DISCONTINUED | OUTPATIENT
Start: 2019-05-25 | End: 2019-05-25

## 2019-05-25 RX ORDER — SIMETHICONE 80 MG/1
80 TABLET, CHEWABLE ORAL EVERY 4 HOURS
Refills: 0 | Status: DISCONTINUED | OUTPATIENT
Start: 2019-05-25 | End: 2019-05-27

## 2019-05-25 RX ORDER — HYDROCORTISONE 1 %
1 OINTMENT (GRAM) TOPICAL EVERY 6 HOURS
Refills: 0 | Status: DISCONTINUED | OUTPATIENT
Start: 2019-05-25 | End: 2019-05-27

## 2019-05-25 RX ADMIN — Medication 100 MILLIGRAM(S): at 02:38

## 2019-05-25 RX ADMIN — Medication 204 MILLIGRAM(S): at 06:58

## 2019-05-25 RX ADMIN — Medication 600 MILLIGRAM(S): at 15:50

## 2019-05-25 RX ADMIN — Medication 204 MILLIGRAM(S): at 21:48

## 2019-05-25 RX ADMIN — Medication 100 MILLIGRAM(S): at 14:28

## 2019-05-25 RX ADMIN — Medication 100 MILLIGRAM(S): at 21:38

## 2019-05-25 RX ADMIN — Medication 100 MILLIGRAM(S): at 14:27

## 2019-05-25 RX ADMIN — SODIUM CHLORIDE 3 MILLILITER(S): 9 INJECTION INTRAMUSCULAR; INTRAVENOUS; SUBCUTANEOUS at 06:07

## 2019-05-25 RX ADMIN — Medication 600 MILLIGRAM(S): at 22:30

## 2019-05-25 RX ADMIN — Medication 1 TABLET(S): at 14:27

## 2019-05-25 RX ADMIN — Medication 600 MILLIGRAM(S): at 21:38

## 2019-05-25 RX ADMIN — Medication 600 MILLIGRAM(S): at 15:05

## 2019-05-25 RX ADMIN — Medication 204 MILLIGRAM(S): at 14:28

## 2019-05-25 RX ADMIN — SODIUM CHLORIDE 3 MILLILITER(S): 9 INJECTION INTRAMUSCULAR; INTRAVENOUS; SUBCUTANEOUS at 14:28

## 2019-05-25 RX ADMIN — SODIUM CHLORIDE 3 MILLILITER(S): 9 INJECTION INTRAMUSCULAR; INTRAVENOUS; SUBCUTANEOUS at 21:39

## 2019-05-25 NOTE — DISCHARGE NOTE OB - PATIENT PORTAL LINK FT
You can access the [facility] Patient Portal, offered by [facility], by registering with the following website: [email or web address]

## 2019-05-25 NOTE — DISCHARGE NOTE OB - PROVIDER TOKENS
FREE:[LAST:[[facility]],PHONE:[phone]],FAX:[(   )    -],ADDRESS:[[address]]]

## 2019-05-25 NOTE — DISCHARGE NOTE OB - CARE PLAN
Goal:	breastfeeding  Assessment and plan of treatment:	routine vaginal delivery care, no tub baths, douching or sex for 6weeks, ambulate daily Principal Discharge DX:	Normal spontaneous vaginal delivery  Goal:	pain management and breast feeding  Assessment and plan of treatment:	Continue breastfeeding.  Motrin as needed for pain. Avoid strenuous activity, no heavy lifting. Nothing per vagina x 6 weeks - no sex, tampons, douching, tub baths, etc.  Follow up in office in 5-6 weeks for check up  Secondary Diagnosis:	Anemia due to blood loss  Goal:	continue iron po  Secondary Diagnosis:	GDM, class A2  Goal:	follow up in 6 weeks for glucose tolerance test

## 2019-05-25 NOTE — DISCHARGE NOTE OB - HOSPITAL COURSE
Medical induction fo labor for GDMA2 at 39.1 wks. Pt had routine post partum care. Medical induction fo labor for GDMA2 at 39.1 wks. Pt had routine post partum care.  discharged home with iron po; prescriptions sent to pharmacy   to follow up in 6 weeks for glucose test

## 2019-05-25 NOTE — DISCHARGE NOTE OB - MEDICATION SUMMARY - MEDICATIONS TO TAKE
I will START or STAY ON the medications listed below when I get home from the hospital:    ibuprofen 600 mg oral tablet  -- 1 tab(s) by mouth every 6 hours, As needed, Moderate Pain (4 - 6)  -- Indication: For pain, as needed    Prenatal Multivitamins with Folic Acid 1 mg oral tablet  -- 1 tab(s) by mouth once a day  -- Indication: For breast feeding    FeroSul 325 mg (65 mg elemental iron) oral tablet  -- 1 tab(s) by mouth 3 times a day   -- Check with your doctor before becoming pregnant.  Do not chew, break, or crush.  May discolor urine or feces.    -- Indication: For Anemia due to blood loss    docusate sodium 100 mg oral capsule  -- 1 cap(s) by mouth 2 times a day  -- Indication: For Stool softener

## 2019-05-25 NOTE — DISCHARGE NOTE OB - CARE PROVIDER_API CALL
[facility],   Women health clinic  [address] [facility]  Phone: [phone]  Fax: (   )    -  Follow Up Time:

## 2019-05-25 NOTE — DISCHARGE NOTE OB - PLAN OF CARE
breastfeeding routine vaginal delivery care, no tub baths, douching or sex for 6weeks, ambulate daily pain management and breast feeding Continue breastfeeding.  Motrin as needed for pain. Avoid strenuous activity, no heavy lifting. Nothing per vagina x 6 weeks - no sex, tampons, douching, tub baths, etc.  Follow up in office in 5-6 weeks for check up continue iron po follow up in 6 weeks for glucose tolerance test

## 2019-05-26 DIAGNOSIS — O24.419 GESTATIONAL DIABETES MELLITUS IN PREGNANCY, UNSPECIFIED CONTROL: ICD-10-CM

## 2019-05-26 DIAGNOSIS — D50.0 IRON DEFICIENCY ANEMIA SECONDARY TO BLOOD LOSS (CHRONIC): ICD-10-CM

## 2019-05-26 LAB
BASOPHILS # BLD AUTO: 0.04 K/UL — SIGNIFICANT CHANGE UP (ref 0–0.2)
BASOPHILS NFR BLD AUTO: 0.3 % — SIGNIFICANT CHANGE UP (ref 0–2)
EOSINOPHIL # BLD AUTO: 0.11 K/UL — SIGNIFICANT CHANGE UP (ref 0–0.5)
EOSINOPHIL NFR BLD AUTO: 0.9 % — SIGNIFICANT CHANGE UP (ref 0–6)
HCT VFR BLD CALC: 24.8 % — LOW (ref 34.5–45)
HGB BLD-MCNC: 7.8 G/DL — LOW (ref 11.5–15.5)
IMM GRANULOCYTES NFR BLD AUTO: 0.9 % — SIGNIFICANT CHANGE UP (ref 0–1.5)
LYMPHOCYTES # BLD AUTO: 17.9 % — SIGNIFICANT CHANGE UP (ref 13–44)
LYMPHOCYTES # BLD AUTO: 2.22 K/UL — SIGNIFICANT CHANGE UP (ref 1–3.3)
MCHC RBC-ENTMCNC: 28.6 PG — SIGNIFICANT CHANGE UP (ref 27–34)
MCHC RBC-ENTMCNC: 31.5 GM/DL — LOW (ref 32–36)
MCV RBC AUTO: 90.8 FL — SIGNIFICANT CHANGE UP (ref 80–100)
MONOCYTES # BLD AUTO: 0.87 K/UL — SIGNIFICANT CHANGE UP (ref 0–0.9)
MONOCYTES NFR BLD AUTO: 7 % — SIGNIFICANT CHANGE UP (ref 2–14)
NEUTROPHILS # BLD AUTO: 9.05 K/UL — HIGH (ref 1.8–7.4)
NEUTROPHILS NFR BLD AUTO: 73 % — SIGNIFICANT CHANGE UP (ref 43–77)
NRBC # BLD: 0 /100 WBCS — SIGNIFICANT CHANGE UP (ref 0–0)
PLATELET # BLD AUTO: 153 K/UL — SIGNIFICANT CHANGE UP (ref 150–400)
RBC # BLD: 2.73 M/UL — LOW (ref 3.8–5.2)
RBC # FLD: 15.1 % — HIGH (ref 10.3–14.5)
WBC # BLD: 12.4 K/UL — HIGH (ref 3.8–10.5)
WBC # FLD AUTO: 12.4 K/UL — HIGH (ref 3.8–10.5)

## 2019-05-26 RX ORDER — DOCUSATE SODIUM 100 MG
100 CAPSULE ORAL
Refills: 0 | Status: DISCONTINUED | OUTPATIENT
Start: 2019-05-26 | End: 2019-05-27

## 2019-05-26 RX ORDER — ACETAMINOPHEN 500 MG
975 TABLET ORAL EVERY 6 HOURS
Refills: 0 | Status: DISCONTINUED | OUTPATIENT
Start: 2019-05-26 | End: 2019-05-27

## 2019-05-26 RX ORDER — IRON SUCROSE 20 MG/ML
200 INJECTION, SOLUTION INTRAVENOUS ONCE
Refills: 0 | Status: COMPLETED | OUTPATIENT
Start: 2019-05-26 | End: 2019-05-26

## 2019-05-26 RX ORDER — OXYCODONE HYDROCHLORIDE 5 MG/1
5 TABLET ORAL ONCE
Refills: 0 | Status: DISCONTINUED | OUTPATIENT
Start: 2019-05-26 | End: 2019-05-27

## 2019-05-26 RX ORDER — OXYCODONE HYDROCHLORIDE 5 MG/1
5 TABLET ORAL
Refills: 0 | Status: DISCONTINUED | OUTPATIENT
Start: 2019-05-26 | End: 2019-05-27

## 2019-05-26 RX ADMIN — Medication 600 MILLIGRAM(S): at 23:45

## 2019-05-26 RX ADMIN — Medication 600 MILLIGRAM(S): at 23:13

## 2019-05-26 RX ADMIN — SODIUM CHLORIDE 3 MILLILITER(S): 9 INJECTION INTRAMUSCULAR; INTRAVENOUS; SUBCUTANEOUS at 05:21

## 2019-05-26 RX ADMIN — Medication 600 MILLIGRAM(S): at 06:15

## 2019-05-26 RX ADMIN — Medication 975 MILLIGRAM(S): at 12:00

## 2019-05-26 RX ADMIN — Medication 1 TABLET(S): at 12:03

## 2019-05-26 RX ADMIN — Medication 100 MILLIGRAM(S): at 05:50

## 2019-05-26 RX ADMIN — Medication 975 MILLIGRAM(S): at 16:00

## 2019-05-26 RX ADMIN — Medication 975 MILLIGRAM(S): at 15:04

## 2019-05-26 RX ADMIN — Medication 975 MILLIGRAM(S): at 22:30

## 2019-05-26 RX ADMIN — Medication 975 MILLIGRAM(S): at 11:00

## 2019-05-26 RX ADMIN — Medication 975 MILLIGRAM(S): at 21:58

## 2019-05-26 RX ADMIN — SODIUM CHLORIDE 3 MILLILITER(S): 9 INJECTION INTRAMUSCULAR; INTRAVENOUS; SUBCUTANEOUS at 21:55

## 2019-05-26 RX ADMIN — IRON SUCROSE 110 MILLIGRAM(S): 20 INJECTION, SOLUTION INTRAVENOUS at 11:00

## 2019-05-26 RX ADMIN — Medication 100 MILLIGRAM(S): at 17:52

## 2019-05-26 RX ADMIN — Medication 600 MILLIGRAM(S): at 05:50

## 2019-05-26 RX ADMIN — SODIUM CHLORIDE 3 MILLILITER(S): 9 INJECTION INTRAMUSCULAR; INTRAVENOUS; SUBCUTANEOUS at 14:00

## 2019-05-27 VITALS
TEMPERATURE: 98 F | DIASTOLIC BLOOD PRESSURE: 63 MMHG | SYSTOLIC BLOOD PRESSURE: 97 MMHG | HEART RATE: 79 BPM | OXYGEN SATURATION: 96 % | RESPIRATION RATE: 18 BRPM

## 2019-05-27 LAB
BASOPHILS # BLD AUTO: 0.04 K/UL — SIGNIFICANT CHANGE UP (ref 0–0.2)
BASOPHILS NFR BLD AUTO: 0.4 % — SIGNIFICANT CHANGE UP (ref 0–2)
EOSINOPHIL # BLD AUTO: 0.13 K/UL — SIGNIFICANT CHANGE UP (ref 0–0.5)
EOSINOPHIL NFR BLD AUTO: 1.3 % — SIGNIFICANT CHANGE UP (ref 0–6)
HCT VFR BLD CALC: 27.3 % — LOW (ref 34.5–45)
HGB BLD-MCNC: 8.5 G/DL — LOW (ref 11.5–15.5)
IMM GRANULOCYTES NFR BLD AUTO: 0.9 % — SIGNIFICANT CHANGE UP (ref 0–1.5)
LYMPHOCYTES # BLD AUTO: 2.08 K/UL — SIGNIFICANT CHANGE UP (ref 1–3.3)
LYMPHOCYTES # BLD AUTO: 20.6 % — SIGNIFICANT CHANGE UP (ref 13–44)
MCHC RBC-ENTMCNC: 28.1 PG — SIGNIFICANT CHANGE UP (ref 27–34)
MCHC RBC-ENTMCNC: 31.1 GM/DL — LOW (ref 32–36)
MCV RBC AUTO: 90.4 FL — SIGNIFICANT CHANGE UP (ref 80–100)
MONOCYTES # BLD AUTO: 0.74 K/UL — SIGNIFICANT CHANGE UP (ref 0–0.9)
MONOCYTES NFR BLD AUTO: 7.3 % — SIGNIFICANT CHANGE UP (ref 2–14)
NEUTROPHILS # BLD AUTO: 7.03 K/UL — SIGNIFICANT CHANGE UP (ref 1.8–7.4)
NEUTROPHILS NFR BLD AUTO: 69.5 % — SIGNIFICANT CHANGE UP (ref 43–77)
NRBC # BLD: 0 /100 WBCS — SIGNIFICANT CHANGE UP (ref 0–0)
PLATELET # BLD AUTO: 197 K/UL — SIGNIFICANT CHANGE UP (ref 150–400)
RBC # BLD: 3.02 M/UL — LOW (ref 3.8–5.2)
RBC # FLD: 15.1 % — HIGH (ref 10.3–14.5)
WBC # BLD: 10.11 K/UL — SIGNIFICANT CHANGE UP (ref 3.8–10.5)
WBC # FLD AUTO: 10.11 K/UL — SIGNIFICANT CHANGE UP (ref 3.8–10.5)

## 2019-05-27 RX ORDER — DOCUSATE SODIUM 100 MG
1 CAPSULE ORAL
Qty: 30 | Refills: 0
Start: 2019-05-27 | End: 2019-06-10

## 2019-05-27 RX ORDER — INSULIN GLARGINE 100 [IU]/ML
8 INJECTION, SOLUTION SUBCUTANEOUS
Qty: 0 | Refills: 0 | DISCHARGE

## 2019-05-27 RX ORDER — FERROUS SULFATE 325(65) MG
1 TABLET ORAL
Qty: 90 | Refills: 0
Start: 2019-05-27 | End: 2019-06-25

## 2019-05-27 RX ORDER — IBUPROFEN 200 MG
1 TABLET ORAL
Qty: 40 | Refills: 0
Start: 2019-05-27 | End: 2019-06-05

## 2019-05-27 RX ADMIN — Medication 975 MILLIGRAM(S): at 03:47

## 2019-05-27 RX ADMIN — SODIUM CHLORIDE 3 MILLILITER(S): 9 INJECTION INTRAMUSCULAR; INTRAVENOUS; SUBCUTANEOUS at 05:20

## 2019-05-27 RX ADMIN — Medication 975 MILLIGRAM(S): at 04:20

## 2019-05-27 RX ADMIN — Medication 600 MILLIGRAM(S): at 05:20

## 2019-05-27 RX ADMIN — Medication 100 MILLIGRAM(S): at 05:20

## 2019-05-27 RX ADMIN — Medication 600 MILLIGRAM(S): at 05:57

## 2019-05-27 NOTE — CONSULT NOTE ADULT - SUBJECTIVE AND OBJECTIVE BOX
HPI : Patient is a 28y old  Female who presents with a chief complaint of Medical induction fo labor for GDMA2 at 39.1 wks (25 May 2019 10:59). Hx of gdm started on insulin 8 units in 7 th month of pregnancy now s/p vaginal delivery             PAST MEDICAL & SURGICAL HISTORY:  No pertinent past medical history  No significant past surgical history         MEDICATIONS  (STANDING):  acetaminophen   Tablet .. 975 milliGRAM(s) Oral every 6 hours  dextrose 5%. 1000 milliLiter(s) (50 mL/Hr) IV Continuous <Continuous>  dextrose 50% Injectable 12.5 Gram(s) IV Push once  dextrose 50% Injectable 25 Gram(s) IV Push once  dextrose 50% Injectable 25 Gram(s) IV Push once  diphtheria/tetanus/pertussis (acellular) Vaccine (ADAcel) 0.5 milliLiter(s) IntraMuscular once  docusate sodium 100 milliGRAM(s) Oral two times a day  insulin lispro (HumaLOG) corrective regimen sliding scale   SubCutaneous three times a day before meals  lactated ringers. 1000 milliLiter(s) (125 mL/Hr) IV Continuous <Continuous>  oxytocin Infusion 333.333 milliUNIT(s)/Min (1000 mL/Hr) IV Continuous <Continuous>  prenatal multivitamin 1 Tablet(s) Oral daily  sodium chloride 0.9% lock flush 3 milliLiter(s) IV Push every 8 hours    MEDICATIONS  (PRN):  acetaminophen  Suppository .. 650 milliGRAM(s) Rectal every 4 hours PRN Temp greater or equal to 38C (100.4F)  benzocaine 20%/menthol 0.5% Spray 1 Spray(s) Topical every 6 hours PRN for Perineal discomfort  dextrose 40% Gel 15 Gram(s) Oral once PRN Blood Glucose LESS THAN 70 milliGRAM(s)/deciliter  dibucaine 1% Ointment 1 Application(s) Topical every 6 hours PRN Perineal discomfort  diphenhydrAMINE 25 milliGRAM(s) Oral every 6 hours PRN Pruritus  glucagon  Injectable 1 milliGRAM(s) IntraMuscular once PRN Glucose LESS THAN 70 milligrams/deciliter  glycerin Suppository - Adult 1 Suppository(s) Rectal at bedtime PRN Constipation  hydrocortisone 1% Cream 1 Application(s) Topical every 6 hours PRN Moderate Pain (4-6)  ibuprofen  Tablet. 600 milliGRAM(s) Oral every 6 hours PRN Moderate Pain (4 - 6)  lanolin Ointment 1 Application(s) Topical every 6 hours PRN nipple soreness  magnesium hydroxide Suspension 30 milliLiter(s) Oral two times a day PRN Constipation  oxyCODONE    IR 5 milliGRAM(s) Oral every 3 hours PRN Moderate Pain (4 - 6)  oxyCODONE    IR 5 milliGRAM(s) Oral once PRN Severe Pain (7 - 10)  pramoxine 1%/zinc 5% Cream 1 Application(s) Topical every 4 hours PRN Moderate Pain (4-6)  simethicone 80 milliGRAM(s) Chew every 4 hours PRN Gas  witch hazel Pads 1 Application(s) Topical every 4 hours PRN Perineal discomfort      FAMILY HISTORY:      SOCIAL HISTORY:      REVIEW OF SYSTEMS:  CONSTITUTIONAL: No fever, weight loss, or fatigue  EYES: No eye pain, visual disturbances, or discharge  ENT:  No difficulty hearing, tinnitus, vertigo; No sinus or throat pain  NECK: No pain or stiffness  RESPIRATORY: No cough, wheezing, chills or hemoptysis; No Shortness of Breath  CARDIOVASCULAR: No chest pain, palpitations, passing out, dizziness, or leg swelling  GASTROINTESTINAL: No abdominal or epigastric pain. No nausea, vomiting, or hematemesis; No diarrhea or constipation. No melena or hematochezia.  GENITOURINARY: No dysuria, frequency, hematuria, or incontinence  NEUROLOGICAL: No headaches, memory loss, loss of strength, numbness, or tremors  SKIN: No itching, burning, rashes, or lesions   LYMPH Nodes: No enlarged glands  ENDOCRINE: No heat or cold intolerance; No hair loss  MUSCULOSKELETAL: No joint pain or swelling; No muscle, back, or extremity pain  PSYCHIATRIC: No depression, anxiety, mood swings, or difficulty sleeping  HEME/LYMPH: No easy bruising, or bleeding gums  ALLERGY AND IMMUNOLOGIC: No hives or eczema	        Vital Signs Last 24 Hrs  T(C): 36.4 (27 May 2019 05:36), Max: 36.9 (26 May 2019 17:54)  T(F): 97.6 (27 May 2019 05:36), Max: 98.5 (26 May 2019 17:54)  HR: 79 (27 May 2019 05:36) (79 - 80)  BP: 97/63 (27 May 2019 05:36) (97/63 - 100/66)  BP(mean): --  RR: 18 (27 May 2019 05:36) (16 - 18)  SpO2: 96% (27 May 2019 05:36) (96% - 98%)      Constitutional:        HEENT: nad    Neck:  No JVD, bruits or thyromegaly    Respiratory:  Clear without rales or rhonchi    Cardiovascular:  RR without murmur, rub or gallop.    Gastrointestinal: Soft without hepatosplenomegaly.    Extremities: without cyanosis, clubbing or edema.    Neurological:  Oriented   x  3    . No gross sensory or motor defects.        LABS:                        8.5    10.11 )-----------( 197      ( 27 May 2019 06:42 )             27.3                   CAPILLARY BLOOD GLUCOSE      POCT Blood Glucose.: 75 mg/dL (27 May 2019 06:51)  POCT Blood Glucose.: 90 mg/dL (26 May 2019 22:10)  POCT Blood Glucose.: 117 mg/dL (26 May 2019 15:37)  POCT Blood Glucose.: 98 mg/dL (26 May 2019 11:03)      RADIOLOGY & ADDITIONAL STUDIES:

## 2019-05-27 NOTE — PROGRESS NOTE ADULT - PROBLEM SELECTOR PLAN 1
-orthostatic vital signs   -start IV venofer   -endocrine consult, Dr. Aguilar aware   -Continue pain management  -Encourage OOB and ambulation  -Continue current care  -Plan for discharge tomorrow  -d/w dr. Grant
A/P:  Postpartum day two in stable condition   - Discharge home with instructions  -Follow up in office in 5-6 weeks for postpartum visit  -Breastfeeding encouraged   -Continue pain management  -Encourage OOB and ambulation  -Continue current care

## 2019-05-27 NOTE — PROGRESS NOTE ADULT - SUBJECTIVE AND OBJECTIVE BOX
Patient seen at bedside resting comfortably offers no current complaints. Ambulating and voiding without difficulty.  Passing flatus and tolerating regular diet.  both breast/bottle feeding . Denies HA, CP, SOB, N/V/D, dizziness, palpitations, worsening abdominal pain, worsening vaginal bleeding, or any other concerns.    Vital Signs Last 24 Hrs  T(C): 37.2 (25 May 2019 08:34), Max: 37.4 (25 May 2019 01:17)  T(F): 98.9 (25 May 2019 08:34), Max: 99.4 (25 May 2019 03:30)  HR: 100 (25 May 2019 08:34) (75 - 101)  BP: 96/64 (25 May 2019 08:34) (96/64 - 110/67)  BP(mean): --  RR: 16 (25 May 2019 08:34) (16 - 18)  SpO2: 98% (25 May 2019 08:34) (98% - 100%)    Physical Exam:     Gen: A&Ox 3, NAD  Chest: CTA B/L  Cardiac: S1,S2; RRR  Breast: Soft, nontender, nonengorged  Abdomen: +BS, Soft, nontender,  ND; Fundus firm below umbilicus  Gyn: mod lochia, intact/repaired  Ext: Nontender, DTRS 2+, no worsening edema                          10.8   8.29  )-----------( 198      ( 23 May 2019 16:33 )             33.7       A/P:  PPD#0 s/p     -Continue pain management  -Encourage OOB and ambulation  -Check CBC  -Continue current care  -d/w dr Pierre
Patient seen resting comfortably.  No current complaints.  Denies HA, CP, SOB, N/V/D, dizziness, palpitations, worsening abdominal pain, worsening vaginal bleeding, or any other concerns.  Ambulating and voiding without difficulty.  Passing flatus and tolerating regular diet.  Attempting breastfeeding.     Vital Signs Last 24 Hrs  T(C): 36.4 (27 May 2019 05:36), Max: 36.9 (26 May 2019 17:54)  T(F): 97.6 (27 May 2019 05:36), Max: 98.5 (26 May 2019 17:54)  HR: 79 (27 May 2019 05:36) (79 - 80)  BP: 97/63 (27 May 2019 05:36) (97/63 - 100/66)  RR: 18 (27 May 2019 05:36) (16 - 18)  SpO2: 96% (27 May 2019 05:36) (96% - 98%)    Physical Exam:     Gen: A&Ox 3, NAD  Chest: CTABL  Cardiac: S1+S2+ RRR  Breast: Soft, nontender, nonengorged  Abdomen: Soft, nontender, Fundus firm below umbilicus, +BS  Gyn: Mild lochia  Extremities: Nontender, DTRS 2+, no worsening edema
Patient seen at bedside resting comfortably offers no current complaints. Ambulating and voiding without difficulty.  Passing flatus and tolerating regular diet.  both breast/bottle feeding . Denies HA, CP, SOB, N/V/D, dizziness, palpitations, worsening abdominal pain, worsening vaginal bleeding, or any other concerns.    Vital Signs Last 24 Hrs  T(C): 37 (26 May 2019 06:21), Max: 37.2 (25 May 2019 13:19)  T(F): 98.6 (26 May 2019 06:21), Max: 98.9 (25 May 2019 13:19)  HR: 82 (26 May 2019 06:21) (82 - 102)  BP: 98/65 (26 May 2019 06:21) (91/61 - 105/66)  BP(mean): --  RR: 17 (26 May 2019 06:21) (16 - 17)  SpO2: 96% (26 May 2019 06:21) (96% - 99%)    Physical Exam:     Gen: A&Ox 3, NAD  Chest: CTA B/L  Cardiac: S1+S2; RRR  Breast: Soft, nontender, nonengorged  Abdomen: +Bs; Soft, nontender,  ND; Fundus firm below umbilicus  Gyn: mod lochia, intact/repaired  Ext: Nontender, DTRS 2+, no worsening edema                          7.8    12.40 )-----------( 153      ( 26 May 2019 07:03 )             24.8       A/P: 28 year old PPD#1 s/p , acute blood loss anemia; GDMA2 on insulin; stable     -orthostatic vital signs   -start IV venofer   -endocrine consult, Dr. Aguilar aware   -Continue pain management  -Encourage OOB and ambulation  -Continue current care  -Plan for discharge tomorrow  -d/w dr. Grant

## 2019-05-27 NOTE — PROGRESS NOTE ADULT - PROBLEM SELECTOR PLAN 2
-orthostatic vital signs   -start IV venofer   -endocrine consult, Dr. Aguilar aware   -Continue pain management  -Encourage OOB and ambulation  -Continue current care  -Plan for discharge tomorrow  -d/w dr. Grant
continue to take iron po

## 2019-05-27 NOTE — CONSULT NOTE ADULT - PROBLEM SELECTOR RECOMMENDATION 9
with good fsg  no intervention needed  life style changes and risk of type 2 DM in future d/w pt   f/u with PCP in 6 weeks  monitor fsg randomly to seek medical attention if fsg >200

## 2019-05-27 NOTE — PROGRESS NOTE ADULT - PROBLEM SELECTOR PLAN 3
Finger sticks within normal limits; fasting today was 76  patient instructed to follow up in 6 weeks for glucose tolerance test
-orthostatic vital signs   -start IV venofer   -endocrine consult, Dr. Aguilar aware   -Continue pain management  -Encourage OOB and ambulation  -Continue current care  -Plan for discharge tomorrow  -d/w dr. Grant

## 2019-05-27 NOTE — PROGRESS NOTE ADULT - ASSESSMENT
PPD 2 s/p  @ 39.1wks, miol for gdma2, FS post partum all wnl
A/P: 28 year old PPD#1 s/p , acute blood loss anemia; GDMA2 on insulin; stable

## 2019-05-27 NOTE — CONSULT NOTE ADULT - ASSESSMENT
Patient is a 28y old  Female who presents with a chief complaint of Medical induction fo labor for GDMA2 at 39.1 wks (25 May 2019 10:59). Hx of gdm started on insulin 8 units in 7 th month of pregnancy with overall decent dm control. now s/p vaginal delivery

## 2019-06-28 ENCOUNTER — APPOINTMENT (OUTPATIENT)
Dept: NEUROLOGY | Facility: CLINIC | Age: 29
End: 2019-06-28

## 2019-07-01 ENCOUNTER — APPOINTMENT (OUTPATIENT)
Dept: OBGYN | Facility: CLINIC | Age: 29
End: 2019-07-01
Payer: MEDICAID

## 2019-07-01 ENCOUNTER — OUTPATIENT (OUTPATIENT)
Dept: OUTPATIENT SERVICES | Facility: HOSPITAL | Age: 29
LOS: 1 days | End: 2019-07-01
Payer: MEDICAID

## 2019-07-01 VITALS
HEART RATE: 89 BPM | BODY MASS INDEX: 31.47 KG/M2 | SYSTOLIC BLOOD PRESSURE: 92 MMHG | WEIGHT: 136 LBS | HEIGHT: 55 IN | DIASTOLIC BLOOD PRESSURE: 63 MMHG | TEMPERATURE: 97.7 F

## 2019-07-01 DIAGNOSIS — Z34.00 ENCOUNTER FOR SUPERVISION OF NORMAL FIRST PREGNANCY, UNSPECIFIED TRIMESTER: ICD-10-CM

## 2019-07-01 PROCEDURE — 99213 OFFICE O/P EST LOW 20 MIN: CPT | Mod: NC

## 2019-07-01 NOTE — HISTORY OF PRESENT ILLNESS
[Postpartum Follow Up] : postpartum follow up [Last Pap Date: ___] : Last Pap Date: [unfilled] [Delivery Date: ___] : on [unfilled] [] : delivered by vaginal delivery [Male] : Delivery History: baby boy [Wt. ___] : weighing [unfilled] [Breastfeeding] : currently nursing [Discharge HCT: ___] : hematocrit level was [unfilled] [Discharge HGB: ___] : hemoglobin level was [unfilled] [Resumed Menses] : has resumed her menses [Complications:___] : no complications [BF with Difficulty] : nursing without difficulty [Resumed [place]] : has not resumed intercourse [Intended Contraception] : the patient does not intended to use contraception postpartum [Abstinence] : abstinence [Condoms] : condoms [Breast Pain] : no breast pain [BreastFeeding Problems] : breastfeeding problems [S/Sx PP Depression] : no signs/symptoms of postpartum depression [Back to Normal] : is back to normal in size [Normal] : the vagina was normal [Healing Well] : is healing well [Cervix Sample Taken] : cervical sample not taken for a Pap smear [Examination Of The Breasts] : breasts are normal [Doing Well] : is doing well [None] : None [de-identified] : s/p IOL at 39 wks for A2 GDM. uncomplicated delivery course [de-identified] : routine PP exam, declines hormonal or implantable contraception at this time, requested condoms. Denies s/s PP depression [de-identified] : return Wed 7/3 for 2 hr fasting GTT. Discussed birth control, understands withdrawal not considered effective method (was unaware) Condoms prescribed. Pap UTD EBDS 0 ( see hard copy)

## 2019-07-03 ENCOUNTER — APPOINTMENT (OUTPATIENT)
Dept: OBGYN | Facility: CLINIC | Age: 29
End: 2019-07-03

## 2019-07-03 PROCEDURE — 82951 GLUCOSE TOLERANCE TEST (GTT): CPT

## 2019-07-03 PROCEDURE — G0463: CPT

## 2019-07-04 LAB
GLUCOSE 2H P GLC SERPL-MCNC: 82 MG/DL — SIGNIFICANT CHANGE UP (ref 70–139)
GLUCOSE P FAST BLDV-MCNC: 88 MG/DL — SIGNIFICANT CHANGE UP (ref 70–99)

## 2019-07-10 PROCEDURE — 88307 TISSUE EXAM BY PATHOLOGIST: CPT

## 2019-07-10 PROCEDURE — 86780 TREPONEMA PALLIDUM: CPT

## 2019-07-10 PROCEDURE — 36415 COLL VENOUS BLD VENIPUNCTURE: CPT

## 2019-07-10 PROCEDURE — 59025 FETAL NON-STRESS TEST: CPT

## 2019-07-10 PROCEDURE — 86900 BLOOD TYPING SEROLOGIC ABO: CPT

## 2019-07-10 PROCEDURE — 82962 GLUCOSE BLOOD TEST: CPT

## 2019-07-10 PROCEDURE — 86850 RBC ANTIBODY SCREEN: CPT

## 2019-07-10 PROCEDURE — 86901 BLOOD TYPING SEROLOGIC RH(D): CPT

## 2019-07-10 PROCEDURE — 59050 FETAL MONITOR W/REPORT: CPT

## 2019-07-10 PROCEDURE — 85384 FIBRINOGEN ACTIVITY: CPT

## 2019-07-10 PROCEDURE — 85027 COMPLETE CBC AUTOMATED: CPT

## 2019-07-10 PROCEDURE — 85610 PROTHROMBIN TIME: CPT

## 2019-07-10 PROCEDURE — 86923 COMPATIBILITY TEST ELECTRIC: CPT

## 2019-07-10 PROCEDURE — 83036 HEMOGLOBIN GLYCOSYLATED A1C: CPT

## 2019-07-10 PROCEDURE — G0463: CPT

## 2019-07-10 PROCEDURE — 85730 THROMBOPLASTIN TIME PARTIAL: CPT

## 2021-03-05 ENCOUNTER — APPOINTMENT (OUTPATIENT)
Dept: OBGYN | Facility: CLINIC | Age: 31
End: 2021-03-05
Payer: MEDICAID

## 2021-03-05 ENCOUNTER — OUTPATIENT (OUTPATIENT)
Dept: OUTPATIENT SERVICES | Facility: HOSPITAL | Age: 31
LOS: 1 days | End: 2021-03-05
Payer: MEDICAID

## 2021-03-05 VITALS
WEIGHT: 150 LBS | HEIGHT: 59 IN | HEART RATE: 98 BPM | BODY MASS INDEX: 30.24 KG/M2 | OXYGEN SATURATION: 100 % | RESPIRATION RATE: 18 BRPM | SYSTOLIC BLOOD PRESSURE: 127 MMHG | TEMPERATURE: 98.1 F | DIASTOLIC BLOOD PRESSURE: 80 MMHG

## 2021-03-05 DIAGNOSIS — Z00.00 ENCOUNTER FOR GENERAL ADULT MEDICAL EXAMINATION WITHOUT ABNORMAL FINDINGS: ICD-10-CM

## 2021-03-05 DIAGNOSIS — Z01.419 ENCOUNTER FOR GYNECOLOGICAL EXAMINATION (GENERAL) (ROUTINE) W/OUT ABNORMAL FINDINGS: ICD-10-CM

## 2021-03-05 PROCEDURE — G0463: CPT

## 2021-03-05 PROCEDURE — 87591 N.GONORRHOEAE DNA AMP PROB: CPT

## 2021-03-05 PROCEDURE — 87624 HPV HI-RISK TYP POOLED RSLT: CPT

## 2021-03-05 PROCEDURE — 87491 CHLMYD TRACH DNA AMP PROBE: CPT

## 2021-03-05 PROCEDURE — 84443 ASSAY THYROID STIM HORMONE: CPT

## 2021-03-05 PROCEDURE — 84702 CHORIONIC GONADOTROPIN TEST: CPT

## 2021-03-05 PROCEDURE — 99395 PREV VISIT EST AGE 18-39: CPT

## 2021-03-05 NOTE — HISTORY OF PRESENT ILLNESS
[Patient reported PAP Smear was normal] : Patient reported PAP Smear was normal [N] : Patient does not use contraception [Y] : Positive pregnancy history [FreeTextEntry1] : Patient is here due to missed menstrual period. LMP- 21\par UPT neg\par \par \par H/o reg periods until 2021\par  [PapSmeardate] : 12/14/18 [LMPDate] : 1/12/21 [MensesFreq] : 14-21  [MensesAmount] : light [PGHxTotal] : 2 [[facility] CenterxFulerm] : 1 [PGxPremature] : 1 [PGHxAbortions] : 0 [Abrazo Central Campusiving] : 1 [PGHxABInduced] : 0 [PGHxABSpont] : 0 [PGHxEctopic] : 0 [PGHxMultBirths] : 0

## 2021-03-08 DIAGNOSIS — N92.6 IRREGULAR MENSTRUATION, UNSPECIFIED: ICD-10-CM

## 2021-03-08 DIAGNOSIS — Z01.419 ENCOUNTER FOR GYNECOLOGICAL EXAMINATION (GENERAL) (ROUTINE) WITHOUT ABNORMAL FINDINGS: ICD-10-CM

## 2021-03-12 LAB
C TRACH RRNA SPEC QL NAA+PROBE: NOT DETECTED
CYTOLOGY CVX/VAG DOC THIN PREP: NORMAL
HCG SERPL-MCNC: <1 MIU/ML
HPV HIGH+LOW RISK DNA PNL CVX: NOT DETECTED
N GONORRHOEA RRNA SPEC QL NAA+PROBE: NOT DETECTED
SOURCE TP AMPLIFICATION: NORMAL
TSH SERPL-ACNC: 1.23 UIU/ML

## 2021-04-02 ENCOUNTER — APPOINTMENT (OUTPATIENT)
Dept: OBGYN | Facility: CLINIC | Age: 31
End: 2021-04-02
Payer: MEDICAID

## 2021-04-02 ENCOUNTER — OUTPATIENT (OUTPATIENT)
Dept: OUTPATIENT SERVICES | Facility: HOSPITAL | Age: 31
LOS: 1 days | End: 2021-04-02
Payer: MEDICAID

## 2021-04-02 VITALS
SYSTOLIC BLOOD PRESSURE: 108 MMHG | TEMPERATURE: 97.7 F | DIASTOLIC BLOOD PRESSURE: 72 MMHG | OXYGEN SATURATION: 99 % | HEIGHT: 59 IN | HEART RATE: 94 BPM | WEIGHT: 148 LBS | BODY MASS INDEX: 29.84 KG/M2

## 2021-04-02 DIAGNOSIS — Z00.00 ENCOUNTER FOR GENERAL ADULT MEDICAL EXAMINATION WITHOUT ABNORMAL FINDINGS: ICD-10-CM

## 2021-04-02 PROCEDURE — 99212 OFFICE O/P EST SF 10 MIN: CPT

## 2021-04-02 PROCEDURE — G0463: CPT

## 2021-04-02 NOTE — HISTORY OF PRESENT ILLNESS
[FreeTextEntry1] : Pt is here for f/u\par Period came 3/3/21\par \par TSH nl\par hcg neg\par Pap/hpv: neg/neg\par

## 2021-04-05 DIAGNOSIS — Z01.419 ENCOUNTER FOR GYNECOLOGICAL EXAMINATION (GENERAL) (ROUTINE) WITHOUT ABNORMAL FINDINGS: ICD-10-CM

## 2021-05-21 ENCOUNTER — OUTPATIENT (OUTPATIENT)
Dept: OUTPATIENT SERVICES | Facility: HOSPITAL | Age: 31
LOS: 1 days | End: 2021-05-21
Payer: MEDICAID

## 2021-05-21 ENCOUNTER — APPOINTMENT (OUTPATIENT)
Dept: OBGYN | Facility: CLINIC | Age: 31
End: 2021-05-21
Payer: MEDICAID

## 2021-05-21 VITALS
WEIGHT: 142 LBS | OXYGEN SATURATION: 100 % | DIASTOLIC BLOOD PRESSURE: 73 MMHG | HEART RATE: 102 BPM | SYSTOLIC BLOOD PRESSURE: 119 MMHG | HEIGHT: 59 IN | BODY MASS INDEX: 28.63 KG/M2 | TEMPERATURE: 98.1 F

## 2021-05-21 DIAGNOSIS — O09.292 SUPERVISION OF PREGNANCY WITH OTHER POOR REPRODUCTIVE OR OBSTETRIC HISTORY, SECOND TRIMESTER: ICD-10-CM

## 2021-05-21 DIAGNOSIS — N92.6 IRREGULAR MENSTRUATION, UNSPECIFIED: ICD-10-CM

## 2021-05-21 DIAGNOSIS — Z34.93 ENCOUNTER FOR SUPERVISION OF NORMAL PREGNANCY, UNSPECIFIED, THIRD TRIMESTER: ICD-10-CM

## 2021-05-21 DIAGNOSIS — Z87.898 PERSONAL HISTORY OF OTHER SPECIFIED CONDITIONS: ICD-10-CM

## 2021-05-21 DIAGNOSIS — Z71.2 PERSON CONSULTING FOR EXPLANATION OF EXAMINATION OR TEST FINDINGS: ICD-10-CM

## 2021-05-21 DIAGNOSIS — Z00.00 ENCOUNTER FOR GENERAL ADULT MEDICAL EXAMINATION WITHOUT ABNORMAL FINDINGS: ICD-10-CM

## 2021-05-21 PROCEDURE — 86900 BLOOD TYPING SEROLOGIC ABO: CPT

## 2021-05-21 PROCEDURE — 82950 GLUCOSE TEST: CPT

## 2021-05-21 PROCEDURE — 81220 CFTR GENE COM VARIANTS: CPT

## 2021-05-21 PROCEDURE — 86850 RBC ANTIBODY SCREEN: CPT

## 2021-05-21 PROCEDURE — 83036 HEMOGLOBIN GLYCOSYLATED A1C: CPT

## 2021-05-21 PROCEDURE — 87340 HEPATITIS B SURFACE AG IA: CPT

## 2021-05-21 PROCEDURE — 86803 HEPATITIS C AB TEST: CPT

## 2021-05-21 PROCEDURE — G0463: CPT

## 2021-05-21 PROCEDURE — 83655 ASSAY OF LEAD: CPT

## 2021-05-21 PROCEDURE — 86765 RUBEOLA ANTIBODY: CPT

## 2021-05-21 PROCEDURE — 99213 OFFICE O/P EST LOW 20 MIN: CPT | Mod: 25

## 2021-05-21 PROCEDURE — 86762 RUBELLA ANTIBODY: CPT

## 2021-05-21 PROCEDURE — 84439 ASSAY OF FREE THYROXINE: CPT

## 2021-05-21 PROCEDURE — 86787 VARICELLA-ZOSTER ANTIBODY: CPT

## 2021-05-21 PROCEDURE — 85025 COMPLETE CBC W/AUTO DIFF WBC: CPT

## 2021-05-21 PROCEDURE — 36415 COLL VENOUS BLD VENIPUNCTURE: CPT

## 2021-05-21 PROCEDURE — 86480 TB TEST CELL IMMUN MEASURE: CPT

## 2021-05-21 PROCEDURE — 87086 URINE CULTURE/COLONY COUNT: CPT

## 2021-05-21 PROCEDURE — 87389 HIV-1 AG W/HIV-1&-2 AB AG IA: CPT

## 2021-05-21 PROCEDURE — 36415 COLL VENOUS BLD VENIPUNCTURE: CPT | Mod: NC

## 2021-05-21 PROCEDURE — 81329 SMN1 GENE DOS/DELETION ALYS: CPT

## 2021-05-21 PROCEDURE — 86780 TREPONEMA PALLIDUM: CPT

## 2021-05-21 PROCEDURE — 86901 BLOOD TYPING SEROLOGIC RH(D): CPT

## 2021-05-21 PROCEDURE — 84443 ASSAY THYROID STIM HORMONE: CPT

## 2021-05-21 PROCEDURE — 83020 HEMOGLOBIN ELECTROPHORESIS: CPT

## 2021-05-21 PROCEDURE — 81025 URINE PREGNANCY TEST: CPT

## 2021-05-21 PROCEDURE — 81243 FMR1 GEN ALY DETC ABNL ALLEL: CPT

## 2021-05-21 RX ORDER — INSULIN HUMAN 100 [IU]/ML
100 INJECTION, SUSPENSION SUBCUTANEOUS AT BEDTIME
Qty: 2 | Refills: 3 | Status: DISCONTINUED | COMMUNITY
Start: 2019-03-21 | End: 2021-05-21

## 2021-05-21 RX ORDER — CHLORHEXIDINE GLUCONATE 4 %
325 (65 FE) LIQUID (ML) TOPICAL TWICE DAILY
Qty: 60 | Refills: 2 | Status: DISCONTINUED | COMMUNITY
Start: 2018-12-27 | End: 2021-05-21

## 2021-05-21 RX ORDER — ISOPROPYL ALCOHOL 70 ML/100ML
SWAB TOPICAL
Qty: 1 | Refills: 1 | Status: DISCONTINUED | COMMUNITY
Start: 2019-03-04 | End: 2021-05-21

## 2021-05-21 RX ORDER — PEN NEEDLE, DIABETIC 29 G X1/2"
32G X 4 MM NEEDLE, DISPOSABLE MISCELLANEOUS
Qty: 1 | Refills: 2 | Status: DISCONTINUED | COMMUNITY
Start: 2019-03-21 | End: 2021-05-21

## 2021-05-21 RX ORDER — BLOOD SUGAR DIAGNOSTIC
STRIP MISCELLANEOUS 4 TIMES DAILY
Qty: 2 | Refills: 2 | Status: DISCONTINUED | COMMUNITY
Start: 2019-04-18 | End: 2021-05-21

## 2021-05-21 RX ORDER — BLOOD-GLUCOSE METER
W/DEVICE KIT MISCELLANEOUS
Qty: 1 | Refills: 0 | Status: DISCONTINUED | COMMUNITY
Start: 2019-03-04 | End: 2021-05-21

## 2021-05-21 RX ORDER — GLUC/MSM/COLGN2/HYAL/ANTIARTH3 375-375-20
240 (27 FE) TABLET ORAL DAILY
Qty: 30 | Refills: 6 | Status: DISCONTINUED | COMMUNITY
Start: 2019-05-09 | End: 2021-05-21

## 2021-05-21 RX ORDER — LANCETS
EACH MISCELLANEOUS
Qty: 1 | Refills: 6 | Status: DISCONTINUED | COMMUNITY
Start: 2019-03-04 | End: 2021-05-21

## 2021-05-21 RX ORDER — PRENATAL VIT NO.130/IRON/FOLIC 27MG-0.8MG
28-0.8 TABLET ORAL DAILY
Qty: 30 | Refills: 5 | Status: DISCONTINUED | COMMUNITY
Start: 2019-04-11 | End: 2021-05-21

## 2021-05-21 RX ORDER — FOLIC ACID 5 MG
5 CAPSULE ORAL
Qty: 30 | Refills: 5 | Status: DISCONTINUED | COMMUNITY
Start: 2018-12-17 | End: 2021-05-21

## 2021-05-21 RX ORDER — BLOOD SUGAR DIAGNOSTIC
STRIP MISCELLANEOUS 4 TIMES DAILY
Qty: 100 | Refills: 2 | Status: DISCONTINUED | COMMUNITY
Start: 2019-03-04 | End: 2021-05-21

## 2021-05-21 RX ORDER — URINE ACETONE TEST STRIPS
STRIP MISCELLANEOUS
Qty: 2 | Refills: 2 | Status: DISCONTINUED | COMMUNITY
Start: 2019-03-21 | End: 2021-05-21

## 2021-05-21 NOTE — HISTORY OF PRESENT ILLNESS
[FreeTextEntry1] : Pateint presents for f/u, complaining of irregular periods, LMP(03/04/21):  \par \par POCT - Positive pt made aware of results today, ~ 11.3 weeks by LMP.  Patient is sexually active with her , not using contraception.\par \par Pateint is happy with the knowledge of her pregnancy.  Pateint denies any complaints of nausea, only experiencing mild nausea. \par \par Patient has hx/o GDMA-2, last DM screen done with PCP last year as per patient, was negative.\par \par \par \par  [Currently Active] : currently active [Men] : men [Vaginal] : vaginal

## 2021-05-21 NOTE — PHYSICAL EXAM
[Appropriately responsive] : appropriately responsive [Alert] : alert [No Acute Distress] : no acute distress [No Murmurs] : no murmurs [Non-tender] : non-tender [Non-distended] : non-distended [No HSM] : No HSM [No Lesions] : no lesions [No Mass] : no mass [Oriented x3] : oriented x3 [FreeTextEntry7] : (+)FHR by bedside ultrasound [Normal] : normal [No Masses] : no breast masses were palpable

## 2021-05-24 DIAGNOSIS — Z87.42 PERSONAL HISTORY OF OTHER DISEASES OF THE FEMALE GENITAL TRACT: ICD-10-CM

## 2021-05-24 DIAGNOSIS — Z3A.11 11 WEEKS GESTATION OF PREGNANCY: ICD-10-CM

## 2021-05-24 DIAGNOSIS — Z32.01 ENCOUNTER FOR PREGNANCY TEST, RESULT POSITIVE: ICD-10-CM

## 2021-05-24 DIAGNOSIS — F95.9 TIC DISORDER, UNSPECIFIED: ICD-10-CM

## 2021-05-24 DIAGNOSIS — Z11.3 ENCOUNTER FOR SCREENING FOR INFECTIONS WITH A PREDOMINANTLY SEXUAL MODE OF TRANSMISSION: ICD-10-CM

## 2021-05-24 LAB
ABO + RH PNL BLD: NORMAL
BACTERIA UR CULT: NORMAL
BASOPHILS # BLD AUTO: 0.03 K/UL
BASOPHILS NFR BLD AUTO: 0.4 %
BLD GP AB SCN SERPL QL: NORMAL
C TRACH RRNA SPEC QL NAA+PROBE: NOT DETECTED
EOSINOPHIL # BLD AUTO: 0.02 K/UL
EOSINOPHIL NFR BLD AUTO: 0.2 %
ESTIMATED AVERAGE GLUCOSE: 128 MG/DL
GLUCOSE 1H P 50 G GLC PO SERPL-MCNC: 196 MG/DL
HBA1C MFR BLD HPLC: 6.1 %
HBV SURFACE AG SER QL: NONREACTIVE
HCT VFR BLD CALC: 33.8 %
HCV AB SER QL: NONREACTIVE
HCV S/CO RATIO: 0.12 S/CO
HGB A MFR BLD: 97.7 %
HGB A2 MFR BLD: 2.3 %
HGB BLD-MCNC: 10.4 G/DL
HGB FRACT BLD-IMP: NORMAL
HIV1+2 AB SPEC QL IA.RAPID: NONREACTIVE
IMM GRANULOCYTES NFR BLD AUTO: 0.2 %
LEAD BLD-MCNC: <1 UG/DL
LYMPHOCYTES # BLD AUTO: 1.74 K/UL
LYMPHOCYTES NFR BLD AUTO: 21.3 %
M TB IFN-G BLD-IMP: NEGATIVE
MAN DIFF?: NORMAL
MCHC RBC-ENTMCNC: 28 PG
MCHC RBC-ENTMCNC: 30.8 GM/DL
MCV RBC AUTO: 91.1 FL
MEV IGG FLD QL IA: >300 AU/ML
MEV IGG+IGM SER-IMP: POSITIVE
MONOCYTES # BLD AUTO: 0.39 K/UL
MONOCYTES NFR BLD AUTO: 4.8 %
N GONORRHOEA RRNA SPEC QL NAA+PROBE: NOT DETECTED
NEUTROPHILS # BLD AUTO: 5.95 K/UL
NEUTROPHILS NFR BLD AUTO: 73.1 %
PLATELET # BLD AUTO: 295 K/UL
QUANTIFERON TB PLUS MITOGEN MINUS NIL: 8.82 IU/ML
QUANTIFERON TB PLUS NIL: 0.03 IU/ML
QUANTIFERON TB PLUS TB1 MINUS NIL: 0 IU/ML
QUANTIFERON TB PLUS TB2 MINUS NIL: 0 IU/ML
RBC # BLD: 3.71 M/UL
RBC # FLD: 13.6 %
RUBV IGG FLD-ACNC: 28.2 INDEX
RUBV IGG SER-IMP: POSITIVE
SOURCE AMPLIFICATION: NORMAL
T PALLIDUM AB SER QL IA: NEGATIVE
T4 FREE SERPL-MCNC: 1.1 NG/DL
TSH SERPL-ACNC: 0.6 UIU/ML
VZV AB TITR SER: POSITIVE
VZV IGG SER IF-ACNC: 851 INDEX
WBC # FLD AUTO: 8.15 K/UL

## 2021-05-29 LAB — AR GENE MUT ANL BLD/T: NORMAL

## 2021-06-01 ENCOUNTER — NON-APPOINTMENT (OUTPATIENT)
Age: 31
End: 2021-06-01

## 2021-06-01 ENCOUNTER — APPOINTMENT (OUTPATIENT)
Dept: ANTEPARTUM | Facility: CLINIC | Age: 31
End: 2021-06-01
Payer: MEDICAID

## 2021-06-01 ENCOUNTER — ASOB RESULT (OUTPATIENT)
Age: 31
End: 2021-06-01

## 2021-06-01 PROCEDURE — G0109 DIAB MANAGE TRN IND/GROUP: CPT

## 2021-06-02 LAB
CFTR MUT TESTED BLD/T: NEGATIVE
FMR1 GENE MUT ANL BLD/T: NORMAL

## 2021-06-04 ENCOUNTER — NON-APPOINTMENT (OUTPATIENT)
Age: 31
End: 2021-06-04

## 2021-06-04 ENCOUNTER — APPOINTMENT (OUTPATIENT)
Dept: ANTEPARTUM | Facility: CLINIC | Age: 31
End: 2021-06-04
Payer: MEDICAID

## 2021-06-04 ENCOUNTER — OUTPATIENT (OUTPATIENT)
Dept: OUTPATIENT SERVICES | Facility: HOSPITAL | Age: 31
LOS: 1 days | End: 2021-06-04
Payer: MEDICAID

## 2021-06-04 ENCOUNTER — APPOINTMENT (OUTPATIENT)
Dept: OBGYN | Facility: CLINIC | Age: 31
End: 2021-06-04
Payer: MEDICAID

## 2021-06-04 ENCOUNTER — ASOB RESULT (OUTPATIENT)
Age: 31
End: 2021-06-04

## 2021-06-04 VITALS
HEIGHT: 59 IN | BODY MASS INDEX: 29.03 KG/M2 | SYSTOLIC BLOOD PRESSURE: 105 MMHG | WEIGHT: 144 LBS | DIASTOLIC BLOOD PRESSURE: 74 MMHG

## 2021-06-04 DIAGNOSIS — Z87.42 PERSONAL HISTORY OF OTHER DISEASES OF THE FEMALE GENITAL TRACT: ICD-10-CM

## 2021-06-04 DIAGNOSIS — Z32.01 ENCOUNTER FOR PREGNANCY TEST, RESULT POSITIVE: ICD-10-CM

## 2021-06-04 DIAGNOSIS — Z34.00 ENCOUNTER FOR SUPERVISION OF NORMAL FIRST PREGNANCY, UNSPECIFIED TRIMESTER: ICD-10-CM

## 2021-06-04 DIAGNOSIS — Z11.3 ENCOUNTER FOR SCREENING FOR INFECTIONS WITH A PREDOMINANTLY SEXUAL MODE OF TRANSMISSION: ICD-10-CM

## 2021-06-04 PROCEDURE — 76801 OB US < 14 WKS SINGLE FETUS: CPT

## 2021-06-04 PROCEDURE — 76813 OB US NUCHAL MEAS 1 GEST: CPT | Mod: 59

## 2021-06-04 PROCEDURE — G0463: CPT

## 2021-06-04 PROCEDURE — 81003 URINALYSIS AUTO W/O SCOPE: CPT

## 2021-06-04 PROCEDURE — 99213 OFFICE O/P EST LOW 20 MIN: CPT

## 2021-06-07 DIAGNOSIS — Z3A.11 11 WEEKS GESTATION OF PREGNANCY: ICD-10-CM

## 2021-06-07 DIAGNOSIS — F95.9 TIC DISORDER, UNSPECIFIED: ICD-10-CM

## 2021-06-07 DIAGNOSIS — Z34.92 ENCOUNTER FOR SUPERVISION OF NORMAL PREGNANCY, UNSPECIFIED, SECOND TRIMESTER: ICD-10-CM

## 2021-06-10 ENCOUNTER — ASOB RESULT (OUTPATIENT)
Age: 31
End: 2021-06-10

## 2021-06-10 ENCOUNTER — APPOINTMENT (OUTPATIENT)
Dept: ANTEPARTUM | Facility: CLINIC | Age: 31
End: 2021-06-10

## 2021-06-15 ENCOUNTER — APPOINTMENT (OUTPATIENT)
Dept: ANTEPARTUM | Facility: CLINIC | Age: 31
End: 2021-06-15
Payer: MEDICAID

## 2021-06-15 ENCOUNTER — ASOB RESULT (OUTPATIENT)
Age: 31
End: 2021-06-15

## 2021-06-15 PROCEDURE — G0108 DIAB MANAGE TRN  PER INDIV: CPT | Mod: 95

## 2021-06-22 ENCOUNTER — NON-APPOINTMENT (OUTPATIENT)
Age: 31
End: 2021-06-22

## 2021-07-01 ENCOUNTER — APPOINTMENT (OUTPATIENT)
Dept: ANTEPARTUM | Facility: CLINIC | Age: 31
End: 2021-07-01
Payer: MEDICAID

## 2021-07-01 ENCOUNTER — ASOB RESULT (OUTPATIENT)
Age: 31
End: 2021-07-01

## 2021-07-01 PROCEDURE — G0108 DIAB MANAGE TRN  PER INDIV: CPT | Mod: 95

## 2021-07-02 ENCOUNTER — APPOINTMENT (OUTPATIENT)
Dept: OBGYN | Facility: CLINIC | Age: 31
End: 2021-07-02
Payer: MEDICAID

## 2021-07-02 ENCOUNTER — NON-APPOINTMENT (OUTPATIENT)
Age: 31
End: 2021-07-02

## 2021-07-02 ENCOUNTER — OUTPATIENT (OUTPATIENT)
Dept: OUTPATIENT SERVICES | Facility: HOSPITAL | Age: 31
LOS: 1 days | End: 2021-07-02
Payer: MEDICAID

## 2021-07-02 ENCOUNTER — APPOINTMENT (OUTPATIENT)
Dept: ANTEPARTUM | Facility: CLINIC | Age: 31
End: 2021-07-02
Payer: MEDICAID

## 2021-07-02 ENCOUNTER — ASOB RESULT (OUTPATIENT)
Age: 31
End: 2021-07-02

## 2021-07-02 VITALS
DIASTOLIC BLOOD PRESSURE: 75 MMHG | OXYGEN SATURATION: 100 % | TEMPERATURE: 97.3 F | SYSTOLIC BLOOD PRESSURE: 116 MMHG | BODY MASS INDEX: 29.03 KG/M2 | RESPIRATION RATE: 18 BRPM | HEIGHT: 59 IN | WEIGHT: 144 LBS | HEART RATE: 112 BPM

## 2021-07-02 DIAGNOSIS — Z34.00 ENCOUNTER FOR SUPERVISION OF NORMAL FIRST PREGNANCY, UNSPECIFIED TRIMESTER: ICD-10-CM

## 2021-07-02 PROCEDURE — 76805 OB US >/= 14 WKS SNGL FETUS: CPT

## 2021-07-02 PROCEDURE — 99214 OFFICE O/P EST MOD 30 MIN: CPT

## 2021-07-02 PROCEDURE — G0463: CPT

## 2021-07-02 PROCEDURE — 81003 URINALYSIS AUTO W/O SCOPE: CPT

## 2021-07-06 DIAGNOSIS — Z34.92 ENCOUNTER FOR SUPERVISION OF NORMAL PREGNANCY, UNSPECIFIED, SECOND TRIMESTER: ICD-10-CM

## 2021-07-06 DIAGNOSIS — F95.9 TIC DISORDER, UNSPECIFIED: ICD-10-CM

## 2021-07-06 DIAGNOSIS — Z3A.15 15 WEEKS GESTATION OF PREGNANCY: ICD-10-CM

## 2021-07-15 ENCOUNTER — ASOB RESULT (OUTPATIENT)
Age: 31
End: 2021-07-15

## 2021-07-15 ENCOUNTER — APPOINTMENT (OUTPATIENT)
Dept: ANTEPARTUM | Facility: CLINIC | Age: 31
End: 2021-07-15
Payer: MEDICAID

## 2021-07-15 PROCEDURE — G0108 DIAB MANAGE TRN  PER INDIV: CPT | Mod: 95

## 2021-07-29 ENCOUNTER — ASOB RESULT (OUTPATIENT)
Age: 31
End: 2021-07-29

## 2021-07-29 ENCOUNTER — APPOINTMENT (OUTPATIENT)
Dept: ANTEPARTUM | Facility: CLINIC | Age: 31
End: 2021-07-29
Payer: MEDICAID

## 2021-07-29 PROCEDURE — G0108 DIAB MANAGE TRN  PER INDIV: CPT | Mod: 95

## 2021-07-30 ENCOUNTER — ASOB RESULT (OUTPATIENT)
Age: 31
End: 2021-07-30

## 2021-07-30 ENCOUNTER — APPOINTMENT (OUTPATIENT)
Dept: ANTEPARTUM | Facility: CLINIC | Age: 31
End: 2021-07-30
Payer: MEDICAID

## 2021-07-30 ENCOUNTER — APPOINTMENT (OUTPATIENT)
Dept: OBGYN | Facility: CLINIC | Age: 31
End: 2021-07-30
Payer: MEDICAID

## 2021-07-30 ENCOUNTER — OUTPATIENT (OUTPATIENT)
Dept: OUTPATIENT SERVICES | Facility: HOSPITAL | Age: 31
LOS: 1 days | End: 2021-07-30
Payer: MEDICAID

## 2021-07-30 VITALS
HEIGHT: 59 IN | WEIGHT: 145 LBS | DIASTOLIC BLOOD PRESSURE: 67 MMHG | TEMPERATURE: 97.3 F | BODY MASS INDEX: 29.23 KG/M2 | OXYGEN SATURATION: 100 % | HEART RATE: 101 BPM | SYSTOLIC BLOOD PRESSURE: 100 MMHG

## 2021-07-30 DIAGNOSIS — Z34.00 ENCOUNTER FOR SUPERVISION OF NORMAL FIRST PREGNANCY, UNSPECIFIED TRIMESTER: ICD-10-CM

## 2021-07-30 PROCEDURE — 83036 HEMOGLOBIN GLYCOSYLATED A1C: CPT

## 2021-07-30 PROCEDURE — 84156 ASSAY OF PROTEIN URINE: CPT

## 2021-07-30 PROCEDURE — 81003 URINALYSIS AUTO W/O SCOPE: CPT

## 2021-07-30 PROCEDURE — 80053 COMPREHEN METABOLIC PANEL: CPT

## 2021-07-30 PROCEDURE — 36415 COLL VENOUS BLD VENIPUNCTURE: CPT | Mod: NC

## 2021-07-30 PROCEDURE — 99214 OFFICE O/P EST MOD 30 MIN: CPT

## 2021-07-30 PROCEDURE — 86336 INHIBIN A: CPT

## 2021-07-30 PROCEDURE — 84550 ASSAY OF BLOOD/URIC ACID: CPT

## 2021-07-30 PROCEDURE — G0463: CPT

## 2021-07-30 PROCEDURE — 76811 OB US DETAILED SNGL FETUS: CPT

## 2021-07-30 PROCEDURE — 85025 COMPLETE CBC W/AUTO DIFF WBC: CPT

## 2021-07-30 PROCEDURE — 36415 COLL VENOUS BLD VENIPUNCTURE: CPT

## 2021-08-02 ENCOUNTER — NON-APPOINTMENT (OUTPATIENT)
Age: 31
End: 2021-08-02

## 2021-08-02 DIAGNOSIS — F95.9 TIC DISORDER, UNSPECIFIED: ICD-10-CM

## 2021-08-02 DIAGNOSIS — Z34.92 ENCOUNTER FOR SUPERVISION OF NORMAL PREGNANCY, UNSPECIFIED, SECOND TRIMESTER: ICD-10-CM

## 2021-08-02 DIAGNOSIS — Z3A.19 19 WEEKS GESTATION OF PREGNANCY: ICD-10-CM

## 2021-08-02 LAB
1ST TRIMESTER DATA: NORMAL
2ND TRIMESTER DATA: NORMAL
AFP PNL SERPL: NORMAL
AFP SERPL-ACNC: NORMAL
AFP SERPL-ACNC: NORMAL
ALBUMIN SERPL ELPH-MCNC: 4.2 G/DL
ALP BLD-CCNC: 52 U/L
ALT SERPL-CCNC: 9 U/L
ANION GAP SERPL CALC-SCNC: 14 MMOL/L
AST SERPL-CCNC: 13 U/L
B-HCG FREE SERPL-MCNC: NORMAL
BASOPHILS # BLD AUTO: 0.04 K/UL
BASOPHILS NFR BLD AUTO: 0.5 %
BILIRUB SERPL-MCNC: 0.2 MG/DL
BUN SERPL-MCNC: 3 MG/DL
CALCIUM SERPL-MCNC: 9.3 MG/DL
CHLORIDE SERPL-SCNC: 103 MMOL/L
CLINICAL BIOCHEMIST REVIEW: NORMAL
CO2 SERPL-SCNC: 19 MMOL/L
CREAT SERPL-MCNC: 0.36 MG/DL
CREAT SPEC-SCNC: 27 MG/DL
CREAT/PROT UR: NORMAL RATIO
EOSINOPHIL # BLD AUTO: 0.02 K/UL
EOSINOPHIL NFR BLD AUTO: 0.2 %
ESTIMATED AVERAGE GLUCOSE: 114 MG/DL
FREE BETA HCG 1ST TRIMESTER: NORMAL
GLUCOSE SERPL-MCNC: 85 MG/DL
HBA1C MFR BLD HPLC: 5.6 %
HCT VFR BLD CALC: 30.9 %
HGB BLD-MCNC: 9.7 G/DL
IMM GRANULOCYTES NFR BLD AUTO: 0.3 %
INHIBIN A SERPL-MCNC: NORMAL
LYMPHOCYTES # BLD AUTO: 1.58 K/UL
LYMPHOCYTES NFR BLD AUTO: 18.1 %
MAN DIFF?: NORMAL
MCHC RBC-ENTMCNC: 28 PG
MCHC RBC-ENTMCNC: 31.4 GM/DL
MCV RBC AUTO: 89 FL
MONOCYTES # BLD AUTO: 0.49 K/UL
MONOCYTES NFR BLD AUTO: 5.6 %
NEUTROPHILS # BLD AUTO: 6.55 K/UL
NEUTROPHILS NFR BLD AUTO: 75.3 %
NOTES NTD: NORMAL
NT: NORMAL
PAPP-A SERPL-ACNC: NORMAL
PLATELET # BLD AUTO: 232 K/UL
POTASSIUM SERPL-SCNC: 4 MMOL/L
PROT SERPL-MCNC: 7 G/DL
PROT UR-MCNC: <4 MG/DL
RBC # BLD: 3.47 M/UL
RBC # FLD: 13.6 %
SODIUM SERPL-SCNC: 136 MMOL/L
U ESTRIOL SERPL-SCNC: NORMAL
URATE SERPL-MCNC: 3.3 MG/DL
WBC # FLD AUTO: 8.71 K/UL

## 2021-08-05 ENCOUNTER — APPOINTMENT (OUTPATIENT)
Dept: ANTEPARTUM | Facility: CLINIC | Age: 31
End: 2021-08-05
Payer: MEDICAID

## 2021-08-05 ENCOUNTER — ASOB RESULT (OUTPATIENT)
Age: 31
End: 2021-08-05

## 2021-08-05 PROCEDURE — G0108 DIAB MANAGE TRN  PER INDIV: CPT | Mod: 95

## 2021-08-13 ENCOUNTER — APPOINTMENT (OUTPATIENT)
Dept: PEDIATRIC CARDIOLOGY | Facility: CLINIC | Age: 31
End: 2021-08-13
Payer: MEDICAID

## 2021-08-13 ENCOUNTER — OUTPATIENT (OUTPATIENT)
Dept: OUTPATIENT SERVICES | Age: 31
LOS: 1 days | Discharge: HOME | End: 2021-08-13

## 2021-08-13 PROCEDURE — 99202 OFFICE O/P NEW SF 15 MIN: CPT | Mod: 25

## 2021-08-13 PROCEDURE — 93325 DOPPLER ECHO COLOR FLOW MAPG: CPT | Mod: 59

## 2021-08-13 PROCEDURE — 76825 ECHO EXAM OF FETAL HEART: CPT

## 2021-08-13 PROCEDURE — 76821 MIDDLE CEREBRAL ARTERY ECHO: CPT

## 2021-08-13 PROCEDURE — 76827 ECHO EXAM OF FETAL HEART: CPT

## 2021-08-13 PROCEDURE — 76820 UMBILICAL ARTERY ECHO: CPT

## 2021-08-17 NOTE — PATIENT PROFILE OB - TEACHING/LEARNING FACTORS INFLUENCE READINESS TO LEARN
none PAST SURGICAL HISTORY:  History of appendectomy     History of throat surgery     S/P TURP

## 2021-08-19 ENCOUNTER — ASOB RESULT (OUTPATIENT)
Age: 31
End: 2021-08-19

## 2021-08-19 ENCOUNTER — APPOINTMENT (OUTPATIENT)
Dept: ANTEPARTUM | Facility: CLINIC | Age: 31
End: 2021-08-19
Payer: MEDICAID

## 2021-08-19 PROCEDURE — G0108 DIAB MANAGE TRN  PER INDIV: CPT | Mod: 95

## 2021-08-27 ENCOUNTER — APPOINTMENT (OUTPATIENT)
Dept: ANTEPARTUM | Facility: CLINIC | Age: 31
End: 2021-08-27
Payer: MEDICAID

## 2021-08-27 ENCOUNTER — ASOB RESULT (OUTPATIENT)
Age: 31
End: 2021-08-27

## 2021-08-27 ENCOUNTER — APPOINTMENT (OUTPATIENT)
Dept: OBGYN | Facility: CLINIC | Age: 31
End: 2021-08-27
Payer: MEDICAID

## 2021-08-27 ENCOUNTER — OUTPATIENT (OUTPATIENT)
Dept: OUTPATIENT SERVICES | Facility: HOSPITAL | Age: 31
LOS: 1 days | End: 2021-08-27
Payer: MEDICAID

## 2021-08-27 VITALS
DIASTOLIC BLOOD PRESSURE: 66 MMHG | RESPIRATION RATE: 18 BRPM | OXYGEN SATURATION: 99 % | TEMPERATURE: 97.7 F | SYSTOLIC BLOOD PRESSURE: 100 MMHG | BODY MASS INDEX: 29.84 KG/M2 | HEART RATE: 94 BPM | HEIGHT: 59 IN | WEIGHT: 148 LBS

## 2021-08-27 DIAGNOSIS — Z34.00 ENCOUNTER FOR SUPERVISION OF NORMAL FIRST PREGNANCY, UNSPECIFIED TRIMESTER: ICD-10-CM

## 2021-08-27 PROCEDURE — 99214 OFFICE O/P EST MOD 30 MIN: CPT

## 2021-08-27 PROCEDURE — 81003 URINALYSIS AUTO W/O SCOPE: CPT

## 2021-08-27 PROCEDURE — 76816 OB US FOLLOW-UP PER FETUS: CPT

## 2021-08-27 PROCEDURE — G0463: CPT

## 2021-08-31 DIAGNOSIS — F95.9 TIC DISORDER, UNSPECIFIED: ICD-10-CM

## 2021-08-31 DIAGNOSIS — Z34.92 ENCOUNTER FOR SUPERVISION OF NORMAL PREGNANCY, UNSPECIFIED, SECOND TRIMESTER: ICD-10-CM

## 2021-08-31 DIAGNOSIS — Z3A.23 23 WEEKS GESTATION OF PREGNANCY: ICD-10-CM

## 2021-08-31 DIAGNOSIS — E11.9 TYPE 2 DIABETES MELLITUS WITHOUT COMPLICATIONS: ICD-10-CM

## 2021-09-02 ENCOUNTER — APPOINTMENT (OUTPATIENT)
Dept: ANTEPARTUM | Facility: CLINIC | Age: 31
End: 2021-09-02
Payer: MEDICAID

## 2021-09-02 ENCOUNTER — ASOB RESULT (OUTPATIENT)
Age: 31
End: 2021-09-02

## 2021-09-02 PROCEDURE — G0108 DIAB MANAGE TRN  PER INDIV: CPT | Mod: 95

## 2021-09-16 ENCOUNTER — ASOB RESULT (OUTPATIENT)
Age: 31
End: 2021-09-16

## 2021-09-16 ENCOUNTER — APPOINTMENT (OUTPATIENT)
Dept: ANTEPARTUM | Facility: CLINIC | Age: 31
End: 2021-09-16
Payer: MEDICAID

## 2021-09-16 PROCEDURE — G0108 DIAB MANAGE TRN  PER INDIV: CPT | Mod: 95

## 2021-09-24 ENCOUNTER — OUTPATIENT (OUTPATIENT)
Dept: OUTPATIENT SERVICES | Facility: HOSPITAL | Age: 31
LOS: 1 days | End: 2021-09-24
Payer: MEDICAID

## 2021-09-24 ENCOUNTER — APPOINTMENT (OUTPATIENT)
Dept: OBGYN | Facility: CLINIC | Age: 31
End: 2021-09-24
Payer: MEDICAID

## 2021-09-24 ENCOUNTER — APPOINTMENT (OUTPATIENT)
Dept: ANTEPARTUM | Facility: CLINIC | Age: 31
End: 2021-09-24
Payer: MEDICAID

## 2021-09-24 ENCOUNTER — ASOB RESULT (OUTPATIENT)
Age: 31
End: 2021-09-24

## 2021-09-24 VITALS
TEMPERATURE: 97 F | BODY MASS INDEX: 30.24 KG/M2 | DIASTOLIC BLOOD PRESSURE: 67 MMHG | HEART RATE: 91 BPM | OXYGEN SATURATION: 100 % | HEIGHT: 59 IN | SYSTOLIC BLOOD PRESSURE: 91 MMHG | WEIGHT: 150 LBS | RESPIRATION RATE: 18 BRPM

## 2021-09-24 DIAGNOSIS — Z34.00 ENCOUNTER FOR SUPERVISION OF NORMAL FIRST PREGNANCY, UNSPECIFIED TRIMESTER: ICD-10-CM

## 2021-09-24 PROCEDURE — 36415 COLL VENOUS BLD VENIPUNCTURE: CPT

## 2021-09-24 PROCEDURE — 36415 COLL VENOUS BLD VENIPUNCTURE: CPT | Mod: NC

## 2021-09-24 PROCEDURE — 99214 OFFICE O/P EST MOD 30 MIN: CPT

## 2021-09-24 PROCEDURE — 85025 COMPLETE CBC W/AUTO DIFF WBC: CPT

## 2021-09-24 PROCEDURE — 84156 ASSAY OF PROTEIN URINE: CPT

## 2021-09-24 PROCEDURE — 76816 OB US FOLLOW-UP PER FETUS: CPT

## 2021-09-24 PROCEDURE — 81003 URINALYSIS AUTO W/O SCOPE: CPT

## 2021-09-24 PROCEDURE — 80053 COMPREHEN METABOLIC PANEL: CPT

## 2021-09-24 PROCEDURE — 86780 TREPONEMA PALLIDUM: CPT

## 2021-09-24 PROCEDURE — 83036 HEMOGLOBIN GLYCOSYLATED A1C: CPT

## 2021-09-24 PROCEDURE — G0463: CPT

## 2021-09-25 LAB
ALBUMIN SERPL ELPH-MCNC: 4.2 G/DL
ALP BLD-CCNC: 97 U/L
ALT SERPL-CCNC: 65 U/L
ANION GAP SERPL CALC-SCNC: 14 MMOL/L
AST SERPL-CCNC: 71 U/L
BASOPHILS # BLD AUTO: 0.02 K/UL
BASOPHILS NFR BLD AUTO: 0.3 %
BILIRUB SERPL-MCNC: 0.2 MG/DL
BUN SERPL-MCNC: 4 MG/DL
CALCIUM SERPL-MCNC: 8.8 MG/DL
CHLORIDE SERPL-SCNC: 104 MMOL/L
CO2 SERPL-SCNC: 19 MMOL/L
CREAT SERPL-MCNC: 0.35 MG/DL
CREAT SPEC-SCNC: 30 MG/DL
CREAT/PROT UR: NORMAL RATIO
EOSINOPHIL # BLD AUTO: 0.02 K/UL
EOSINOPHIL NFR BLD AUTO: 0.3 %
ESTIMATED AVERAGE GLUCOSE: 108 MG/DL
GLUCOSE SERPL-MCNC: 79 MG/DL
HBA1C MFR BLD HPLC: 5.4 %
HCT VFR BLD CALC: 34.6 %
HGB BLD-MCNC: 10.7 G/DL
IMM GRANULOCYTES NFR BLD AUTO: 0.8 %
LYMPHOCYTES # BLD AUTO: 1.2 K/UL
LYMPHOCYTES NFR BLD AUTO: 15.6 %
MAN DIFF?: NORMAL
MCHC RBC-ENTMCNC: 29 PG
MCHC RBC-ENTMCNC: 30.9 GM/DL
MCV RBC AUTO: 93.8 FL
MONOCYTES # BLD AUTO: 0.53 K/UL
MONOCYTES NFR BLD AUTO: 6.9 %
NEUTROPHILS # BLD AUTO: 5.86 K/UL
NEUTROPHILS NFR BLD AUTO: 76.1 %
PLATELET # BLD AUTO: 218 K/UL
POTASSIUM SERPL-SCNC: 3.9 MMOL/L
PROT SERPL-MCNC: 6.7 G/DL
PROT UR-MCNC: <4 MG/DL
RBC # BLD: 3.69 M/UL
RBC # FLD: 15.6 %
SODIUM SERPL-SCNC: 136 MMOL/L
T PALLIDUM AB SER QL IA: NEGATIVE
WBC # FLD AUTO: 7.69 K/UL

## 2021-09-27 DIAGNOSIS — Z34.92 ENCOUNTER FOR SUPERVISION OF NORMAL PREGNANCY, UNSPECIFIED, SECOND TRIMESTER: ICD-10-CM

## 2021-09-27 DIAGNOSIS — Z3A.27 27 WEEKS GESTATION OF PREGNANCY: ICD-10-CM

## 2021-09-27 DIAGNOSIS — F95.9 TIC DISORDER, UNSPECIFIED: ICD-10-CM

## 2021-09-27 DIAGNOSIS — E11.9 TYPE 2 DIABETES MELLITUS WITHOUT COMPLICATIONS: ICD-10-CM

## 2021-09-30 ENCOUNTER — ASOB RESULT (OUTPATIENT)
Age: 31
End: 2021-09-30

## 2021-09-30 ENCOUNTER — APPOINTMENT (OUTPATIENT)
Dept: ANTEPARTUM | Facility: CLINIC | Age: 31
End: 2021-09-30
Payer: MEDICAID

## 2021-09-30 PROCEDURE — G0108 DIAB MANAGE TRN  PER INDIV: CPT | Mod: 95

## 2021-10-15 ENCOUNTER — OUTPATIENT (OUTPATIENT)
Dept: OUTPATIENT SERVICES | Facility: HOSPITAL | Age: 31
LOS: 1 days | End: 2021-10-15
Payer: MEDICAID

## 2021-10-15 ENCOUNTER — APPOINTMENT (OUTPATIENT)
Dept: OBGYN | Facility: CLINIC | Age: 31
End: 2021-10-15
Payer: MEDICAID

## 2021-10-15 VITALS
WEIGHT: 154 LBS | HEIGHT: 59 IN | RESPIRATION RATE: 16 BRPM | TEMPERATURE: 97.2 F | HEART RATE: 111 BPM | SYSTOLIC BLOOD PRESSURE: 102 MMHG | BODY MASS INDEX: 31.04 KG/M2 | DIASTOLIC BLOOD PRESSURE: 68 MMHG | OXYGEN SATURATION: 100 %

## 2021-10-15 DIAGNOSIS — Z34.00 ENCOUNTER FOR SUPERVISION OF NORMAL FIRST PREGNANCY, UNSPECIFIED TRIMESTER: ICD-10-CM

## 2021-10-15 DIAGNOSIS — Z34.92 ENCOUNTER FOR SUPERVISION OF NORMAL PREGNANCY, UNSPECIFIED, SECOND TRIMESTER: ICD-10-CM

## 2021-10-15 PROCEDURE — 84156 ASSAY OF PROTEIN URINE: CPT

## 2021-10-15 PROCEDURE — G0463: CPT

## 2021-10-15 PROCEDURE — 99214 OFFICE O/P EST MOD 30 MIN: CPT | Mod: 25

## 2021-10-15 PROCEDURE — 81003 URINALYSIS AUTO W/O SCOPE: CPT

## 2021-10-15 PROCEDURE — 90715 TDAP VACCINE 7 YRS/> IM: CPT

## 2021-10-15 PROCEDURE — 36415 COLL VENOUS BLD VENIPUNCTURE: CPT

## 2021-10-15 PROCEDURE — 85025 COMPLETE CBC W/AUTO DIFF WBC: CPT

## 2021-10-15 PROCEDURE — 90471 IMMUNIZATION ADMIN: CPT

## 2021-10-15 PROCEDURE — 90471 IMMUNIZATION ADMIN: CPT | Mod: NC

## 2021-10-15 PROCEDURE — 36415 COLL VENOUS BLD VENIPUNCTURE: CPT | Mod: NC

## 2021-10-15 PROCEDURE — 80053 COMPREHEN METABOLIC PANEL: CPT

## 2021-10-18 DIAGNOSIS — R74.8 ABNORMAL LEVELS OF OTHER SERUM ENZYMES: ICD-10-CM

## 2021-10-18 DIAGNOSIS — Z3A.30 30 WEEKS GESTATION OF PREGNANCY: ICD-10-CM

## 2021-10-18 DIAGNOSIS — E11.9 TYPE 2 DIABETES MELLITUS WITHOUT COMPLICATIONS: ICD-10-CM

## 2021-10-18 DIAGNOSIS — Z23 ENCOUNTER FOR IMMUNIZATION: ICD-10-CM

## 2021-10-18 DIAGNOSIS — Z34.93 ENCOUNTER FOR SUPERVISION OF NORMAL PREGNANCY, UNSPECIFIED, THIRD TRIMESTER: ICD-10-CM

## 2021-10-18 DIAGNOSIS — F95.9 TIC DISORDER, UNSPECIFIED: ICD-10-CM

## 2021-10-18 LAB
ALBUMIN SERPL ELPH-MCNC: 4.1 G/DL
ALP BLD-CCNC: 74 U/L
ALT SERPL-CCNC: 13 U/L
ANION GAP SERPL CALC-SCNC: 16 MMOL/L
AST SERPL-CCNC: 14 U/L
BASOPHILS # BLD AUTO: 0.03 K/UL
BASOPHILS NFR BLD AUTO: 0.4 %
BILIRUB SERPL-MCNC: <0.2 MG/DL
BUN SERPL-MCNC: 4 MG/DL
CALCIUM SERPL-MCNC: 9.2 MG/DL
CHLORIDE SERPL-SCNC: 103 MMOL/L
CO2 SERPL-SCNC: 18 MMOL/L
CREAT SERPL-MCNC: 0.34 MG/DL
CREAT SPEC-SCNC: 33 MG/DL
CREAT/PROT UR: 0.1 RATIO
EOSINOPHIL # BLD AUTO: 0.02 K/UL
EOSINOPHIL NFR BLD AUTO: 0.3 %
GLUCOSE SERPL-MCNC: 101 MG/DL
HCT VFR BLD CALC: 32.2 %
HGB BLD-MCNC: 10.5 G/DL
IMM GRANULOCYTES NFR BLD AUTO: 0.8 %
LYMPHOCYTES # BLD AUTO: 1.61 K/UL
LYMPHOCYTES NFR BLD AUTO: 20.3 %
MAN DIFF?: NORMAL
MCHC RBC-ENTMCNC: 30.2 PG
MCHC RBC-ENTMCNC: 32.6 GM/DL
MCV RBC AUTO: 92.5 FL
MONOCYTES # BLD AUTO: 0.5 K/UL
MONOCYTES NFR BLD AUTO: 6.3 %
NEUTROPHILS # BLD AUTO: 5.72 K/UL
NEUTROPHILS NFR BLD AUTO: 71.9 %
PLATELET # BLD AUTO: 188 K/UL
POTASSIUM SERPL-SCNC: 4 MMOL/L
PROT SERPL-MCNC: 6.6 G/DL
PROT UR-MCNC: 4 MG/DL
RBC # BLD: 3.48 M/UL
RBC # FLD: 15.5 %
SODIUM SERPL-SCNC: 137 MMOL/L
WBC # FLD AUTO: 7.94 K/UL

## 2021-10-21 ENCOUNTER — APPOINTMENT (OUTPATIENT)
Dept: ANTEPARTUM | Facility: CLINIC | Age: 31
End: 2021-10-21

## 2021-10-22 ENCOUNTER — ASOB RESULT (OUTPATIENT)
Age: 31
End: 2021-10-22

## 2021-10-22 ENCOUNTER — APPOINTMENT (OUTPATIENT)
Dept: ANTEPARTUM | Facility: CLINIC | Age: 31
End: 2021-10-22
Payer: MEDICAID

## 2021-10-22 PROCEDURE — 76819 FETAL BIOPHYS PROFIL W/O NST: CPT

## 2021-10-22 PROCEDURE — 76816 OB US FOLLOW-UP PER FETUS: CPT

## 2021-10-28 ENCOUNTER — ASOB RESULT (OUTPATIENT)
Age: 31
End: 2021-10-28

## 2021-10-28 ENCOUNTER — APPOINTMENT (OUTPATIENT)
Dept: ANTEPARTUM | Facility: CLINIC | Age: 31
End: 2021-10-28
Payer: MEDICAID

## 2021-10-28 PROCEDURE — G0108 DIAB MANAGE TRN  PER INDIV: CPT | Mod: 95

## 2021-10-29 ENCOUNTER — NON-APPOINTMENT (OUTPATIENT)
Age: 31
End: 2021-10-29

## 2021-10-29 ENCOUNTER — APPOINTMENT (OUTPATIENT)
Dept: ANTEPARTUM | Facility: CLINIC | Age: 31
End: 2021-10-29
Payer: MEDICAID

## 2021-10-29 ENCOUNTER — OUTPATIENT (OUTPATIENT)
Dept: OUTPATIENT SERVICES | Facility: HOSPITAL | Age: 31
LOS: 1 days | End: 2021-10-29
Payer: MEDICAID

## 2021-10-29 ENCOUNTER — APPOINTMENT (OUTPATIENT)
Dept: OBGYN | Facility: CLINIC | Age: 31
End: 2021-10-29
Payer: MEDICAID

## 2021-10-29 ENCOUNTER — ASOB RESULT (OUTPATIENT)
Age: 31
End: 2021-10-29

## 2021-10-29 VITALS
WEIGHT: 155 LBS | DIASTOLIC BLOOD PRESSURE: 67 MMHG | BODY MASS INDEX: 31.25 KG/M2 | HEART RATE: 101 BPM | SYSTOLIC BLOOD PRESSURE: 97 MMHG | HEIGHT: 59 IN | TEMPERATURE: 97.3 F | OXYGEN SATURATION: 100 % | RESPIRATION RATE: 18 BRPM

## 2021-10-29 DIAGNOSIS — Z23 ENCOUNTER FOR IMMUNIZATION: ICD-10-CM

## 2021-10-29 DIAGNOSIS — R74.8 ABNORMAL LEVELS OF OTHER SERUM ENZYMES: ICD-10-CM

## 2021-10-29 DIAGNOSIS — Z34.00 ENCOUNTER FOR SUPERVISION OF NORMAL FIRST PREGNANCY, UNSPECIFIED TRIMESTER: ICD-10-CM

## 2021-10-29 PROCEDURE — G0463: CPT

## 2021-10-29 PROCEDURE — 81003 URINALYSIS AUTO W/O SCOPE: CPT

## 2021-10-29 PROCEDURE — 76818 FETAL BIOPHYS PROFILE W/NST: CPT

## 2021-10-29 PROCEDURE — 99213 OFFICE O/P EST LOW 20 MIN: CPT

## 2021-11-01 DIAGNOSIS — O99.019 ANEMIA COMPLICATING PREGNANCY, UNSPECIFIED TRIMESTER: ICD-10-CM

## 2021-11-01 DIAGNOSIS — F95.9 TIC DISORDER, UNSPECIFIED: ICD-10-CM

## 2021-11-01 DIAGNOSIS — E11.9 TYPE 2 DIABETES MELLITUS WITHOUT COMPLICATIONS: ICD-10-CM

## 2021-11-01 DIAGNOSIS — O09.299 SUPERVISION OF PREGNANCY WITH OTHER POOR REPRODUCTIVE OR OBSTETRIC HISTORY, UNSPECIFIED TRIMESTER: ICD-10-CM

## 2021-11-01 DIAGNOSIS — Z34.93 ENCOUNTER FOR SUPERVISION OF NORMAL PREGNANCY, UNSPECIFIED, THIRD TRIMESTER: ICD-10-CM

## 2021-11-05 ENCOUNTER — APPOINTMENT (OUTPATIENT)
Dept: ANTEPARTUM | Facility: CLINIC | Age: 31
End: 2021-11-05
Payer: MEDICAID

## 2021-11-05 ENCOUNTER — ASOB RESULT (OUTPATIENT)
Age: 31
End: 2021-11-05

## 2021-11-05 PROCEDURE — 76818 FETAL BIOPHYS PROFILE W/NST: CPT

## 2021-11-11 ENCOUNTER — ASOB RESULT (OUTPATIENT)
Age: 31
End: 2021-11-11

## 2021-11-11 ENCOUNTER — APPOINTMENT (OUTPATIENT)
Dept: ANTEPARTUM | Facility: CLINIC | Age: 31
End: 2021-11-11
Payer: MEDICAID

## 2021-11-11 PROCEDURE — G0108 DIAB MANAGE TRN  PER INDIV: CPT | Mod: 95

## 2021-11-12 ENCOUNTER — APPOINTMENT (OUTPATIENT)
Dept: OBGYN | Facility: CLINIC | Age: 31
End: 2021-11-12
Payer: MEDICAID

## 2021-11-12 ENCOUNTER — NON-APPOINTMENT (OUTPATIENT)
Age: 31
End: 2021-11-12

## 2021-11-12 ENCOUNTER — OUTPATIENT (OUTPATIENT)
Dept: OUTPATIENT SERVICES | Facility: HOSPITAL | Age: 31
LOS: 1 days | End: 2021-11-12
Payer: MEDICAID

## 2021-11-12 ENCOUNTER — ASOB RESULT (OUTPATIENT)
Age: 31
End: 2021-11-12

## 2021-11-12 ENCOUNTER — APPOINTMENT (OUTPATIENT)
Dept: ANTEPARTUM | Facility: CLINIC | Age: 31
End: 2021-11-12
Payer: MEDICAID

## 2021-11-12 VITALS
WEIGHT: 156 LBS | BODY MASS INDEX: 31.45 KG/M2 | RESPIRATION RATE: 18 BRPM | HEART RATE: 102 BPM | SYSTOLIC BLOOD PRESSURE: 93 MMHG | TEMPERATURE: 97.5 F | OXYGEN SATURATION: 98 % | HEIGHT: 59 IN | DIASTOLIC BLOOD PRESSURE: 64 MMHG

## 2021-11-12 DIAGNOSIS — Z34.00 ENCOUNTER FOR SUPERVISION OF NORMAL FIRST PREGNANCY, UNSPECIFIED TRIMESTER: ICD-10-CM

## 2021-11-12 PROCEDURE — G0008: CPT

## 2021-11-12 PROCEDURE — 90686 IIV4 VACC NO PRSV 0.5 ML IM: CPT

## 2021-11-12 PROCEDURE — 81003 URINALYSIS AUTO W/O SCOPE: CPT

## 2021-11-12 PROCEDURE — 99214 OFFICE O/P EST MOD 30 MIN: CPT | Mod: NC,25

## 2021-11-12 PROCEDURE — 76818 FETAL BIOPHYS PROFILE W/NST: CPT

## 2021-11-12 PROCEDURE — 90471 IMMUNIZATION ADMIN: CPT | Mod: NC

## 2021-11-12 PROCEDURE — G0463: CPT

## 2021-11-15 DIAGNOSIS — F95.9 TIC DISORDER, UNSPECIFIED: ICD-10-CM

## 2021-11-15 DIAGNOSIS — Z34.93 ENCOUNTER FOR SUPERVISION OF NORMAL PREGNANCY, UNSPECIFIED, THIRD TRIMESTER: ICD-10-CM

## 2021-11-15 DIAGNOSIS — Z3A.34 34 WEEKS GESTATION OF PREGNANCY: ICD-10-CM

## 2021-11-15 DIAGNOSIS — E11.9 TYPE 2 DIABETES MELLITUS WITHOUT COMPLICATIONS: ICD-10-CM

## 2021-11-15 DIAGNOSIS — Z23 ENCOUNTER FOR IMMUNIZATION: ICD-10-CM

## 2021-11-18 ENCOUNTER — APPOINTMENT (OUTPATIENT)
Dept: ANTEPARTUM | Facility: CLINIC | Age: 31
End: 2021-11-18

## 2021-11-19 ENCOUNTER — APPOINTMENT (OUTPATIENT)
Dept: ANTEPARTUM | Facility: CLINIC | Age: 31
End: 2021-11-19
Payer: MEDICAID

## 2021-11-19 ENCOUNTER — ASOB RESULT (OUTPATIENT)
Age: 31
End: 2021-11-19

## 2021-11-19 PROCEDURE — 76816 OB US FOLLOW-UP PER FETUS: CPT

## 2021-11-19 PROCEDURE — 76818 FETAL BIOPHYS PROFILE W/NST: CPT

## 2021-11-24 ENCOUNTER — APPOINTMENT (OUTPATIENT)
Dept: ANTEPARTUM | Facility: CLINIC | Age: 31
End: 2021-11-24
Payer: MEDICAID

## 2021-11-24 ENCOUNTER — OUTPATIENT (OUTPATIENT)
Dept: OUTPATIENT SERVICES | Facility: HOSPITAL | Age: 31
LOS: 1 days | End: 2021-11-24
Payer: MEDICAID

## 2021-11-24 ENCOUNTER — ASOB RESULT (OUTPATIENT)
Age: 31
End: 2021-11-24

## 2021-11-24 ENCOUNTER — APPOINTMENT (OUTPATIENT)
Dept: OBGYN | Facility: CLINIC | Age: 31
End: 2021-11-24
Payer: MEDICAID

## 2021-11-24 VITALS
TEMPERATURE: 97.2 F | OXYGEN SATURATION: 100 % | SYSTOLIC BLOOD PRESSURE: 95 MMHG | WEIGHT: 160 LBS | HEIGHT: 59 IN | BODY MASS INDEX: 32.25 KG/M2 | HEART RATE: 96 BPM | DIASTOLIC BLOOD PRESSURE: 67 MMHG

## 2021-11-24 DIAGNOSIS — Z34.00 ENCOUNTER FOR SUPERVISION OF NORMAL FIRST PREGNANCY, UNSPECIFIED TRIMESTER: ICD-10-CM

## 2021-11-24 DIAGNOSIS — Z92.29 PERSONAL HISTORY OF OTHER DRUG THERAPY: ICD-10-CM

## 2021-11-24 PROCEDURE — 87389 HIV-1 AG W/HIV-1&-2 AB AG IA: CPT

## 2021-11-24 PROCEDURE — G0108 DIAB MANAGE TRN  PER INDIV: CPT

## 2021-11-24 PROCEDURE — 86780 TREPONEMA PALLIDUM: CPT

## 2021-11-24 PROCEDURE — 76818 FETAL BIOPHYS PROFILE W/NST: CPT

## 2021-11-24 PROCEDURE — 84550 ASSAY OF BLOOD/URIC ACID: CPT

## 2021-11-24 PROCEDURE — G0463: CPT

## 2021-11-24 PROCEDURE — 36415 COLL VENOUS BLD VENIPUNCTURE: CPT | Mod: NC

## 2021-11-24 PROCEDURE — 80053 COMPREHEN METABOLIC PANEL: CPT

## 2021-11-24 PROCEDURE — 85025 COMPLETE CBC W/AUTO DIFF WBC: CPT

## 2021-11-24 PROCEDURE — 99214 OFFICE O/P EST MOD 30 MIN: CPT | Mod: 25

## 2021-11-24 PROCEDURE — 87653 STREP B DNA AMP PROBE: CPT

## 2021-11-24 PROCEDURE — 81003 URINALYSIS AUTO W/O SCOPE: CPT

## 2021-11-24 PROCEDURE — 36415 COLL VENOUS BLD VENIPUNCTURE: CPT

## 2021-11-24 PROCEDURE — 83036 HEMOGLOBIN GLYCOSYLATED A1C: CPT

## 2021-11-24 PROCEDURE — 84156 ASSAY OF PROTEIN URINE: CPT

## 2021-11-29 DIAGNOSIS — Z3A.36 36 WEEKS GESTATION OF PREGNANCY: ICD-10-CM

## 2021-11-29 DIAGNOSIS — Z34.93 ENCOUNTER FOR SUPERVISION OF NORMAL PREGNANCY, UNSPECIFIED, THIRD TRIMESTER: ICD-10-CM

## 2021-11-29 DIAGNOSIS — F95.9 TIC DISORDER, UNSPECIFIED: ICD-10-CM

## 2021-11-29 DIAGNOSIS — E11.9 TYPE 2 DIABETES MELLITUS WITHOUT COMPLICATIONS: ICD-10-CM

## 2021-11-29 LAB
ALBUMIN SERPL ELPH-MCNC: 4.2 G/DL
ALP BLD-CCNC: 107 U/L
ALT SERPL-CCNC: 9 U/L
ANION GAP SERPL CALC-SCNC: 14 MMOL/L
AST SERPL-CCNC: 10 U/L
BASOPHILS # BLD AUTO: 0.03 K/UL
BASOPHILS NFR BLD AUTO: 0.4 %
BILIRUB SERPL-MCNC: 0.2 MG/DL
BUN SERPL-MCNC: 5 MG/DL
C TRACH RRNA SPEC QL NAA+PROBE: NOT DETECTED
CALCIUM SERPL-MCNC: 9.5 MG/DL
CHLORIDE SERPL-SCNC: 104 MMOL/L
CO2 SERPL-SCNC: 19 MMOL/L
CREAT SERPL-MCNC: 0.3 MG/DL
CREAT SPEC-SCNC: 21 MG/DL
CREAT/PROT UR: 0.2 RATIO
EOSINOPHIL # BLD AUTO: 0.04 K/UL
EOSINOPHIL NFR BLD AUTO: 0.5 %
ESTIMATED AVERAGE GLUCOSE: 117 MG/DL
GLUCOSE SERPL-MCNC: 83 MG/DL
GP B STREP DNA SPEC QL NAA+PROBE: NORMAL
GP B STREP DNA SPEC QL NAA+PROBE: NOT DETECTED
HBA1C MFR BLD HPLC: 5.7 %
HCT VFR BLD CALC: 33.4 %
HGB BLD-MCNC: 10.9 G/DL
HIV1+2 AB SPEC QL IA.RAPID: NONREACTIVE
IMM GRANULOCYTES NFR BLD AUTO: 0.6 %
LYMPHOCYTES # BLD AUTO: 1.72 K/UL
LYMPHOCYTES NFR BLD AUTO: 21.8 %
MAN DIFF?: NORMAL
MCHC RBC-ENTMCNC: 29.8 PG
MCHC RBC-ENTMCNC: 32.6 GM/DL
MCV RBC AUTO: 91.3 FL
MONOCYTES # BLD AUTO: 0.61 K/UL
MONOCYTES NFR BLD AUTO: 7.7 %
N GONORRHOEA RRNA SPEC QL NAA+PROBE: NOT DETECTED
NEUTROPHILS # BLD AUTO: 5.45 K/UL
NEUTROPHILS NFR BLD AUTO: 69 %
PLATELET # BLD AUTO: 180 K/UL
POTASSIUM SERPL-SCNC: 4.2 MMOL/L
PROT SERPL-MCNC: 6.9 G/DL
PROT UR-MCNC: 4 MG/DL
RBC # BLD: 3.66 M/UL
RBC # FLD: 13.9 %
SODIUM SERPL-SCNC: 136 MMOL/L
SOURCE AMPLIFICATION: NORMAL
SOURCE GBS: NORMAL
T PALLIDUM AB SER QL IA: NEGATIVE
URATE SERPL-MCNC: 4 MG/DL
WBC # FLD AUTO: 7.9 K/UL

## 2021-12-03 ENCOUNTER — ASOB RESULT (OUTPATIENT)
Age: 31
End: 2021-12-03

## 2021-12-03 ENCOUNTER — APPOINTMENT (OUTPATIENT)
Dept: OBGYN | Facility: CLINIC | Age: 31
End: 2021-12-03
Payer: MEDICAID

## 2021-12-03 ENCOUNTER — OUTPATIENT (OUTPATIENT)
Dept: OUTPATIENT SERVICES | Facility: HOSPITAL | Age: 31
LOS: 1 days | End: 2021-12-03
Payer: MEDICAID

## 2021-12-03 ENCOUNTER — APPOINTMENT (OUTPATIENT)
Dept: ANTEPARTUM | Facility: CLINIC | Age: 31
End: 2021-12-03
Payer: MEDICAID

## 2021-12-03 VITALS
HEIGHT: 59 IN | BODY MASS INDEX: 32.46 KG/M2 | HEART RATE: 83 BPM | SYSTOLIC BLOOD PRESSURE: 111 MMHG | RESPIRATION RATE: 16 BRPM | TEMPERATURE: 97.4 F | DIASTOLIC BLOOD PRESSURE: 70 MMHG | OXYGEN SATURATION: 100 % | WEIGHT: 161 LBS

## 2021-12-03 DIAGNOSIS — Z34.00 ENCOUNTER FOR SUPERVISION OF NORMAL FIRST PREGNANCY, UNSPECIFIED TRIMESTER: ICD-10-CM

## 2021-12-03 PROCEDURE — 76818 FETAL BIOPHYS PROFILE W/NST: CPT

## 2021-12-03 PROCEDURE — G0463: CPT

## 2021-12-03 PROCEDURE — 81003 URINALYSIS AUTO W/O SCOPE: CPT

## 2021-12-03 PROCEDURE — 99213 OFFICE O/P EST LOW 20 MIN: CPT

## 2021-12-07 ENCOUNTER — INPATIENT (INPATIENT)
Facility: HOSPITAL | Age: 31
LOS: 2 days | Discharge: HOME | End: 2021-12-10
Attending: OBSTETRICS & GYNECOLOGY | Admitting: OBSTETRICS & GYNECOLOGY
Payer: MEDICAID

## 2021-12-07 VITALS — HEIGHT: 59 IN | WEIGHT: 160.06 LBS

## 2021-12-07 DIAGNOSIS — Z3A.00 WEEKS OF GESTATION OF PREGNANCY NOT SPECIFIED: ICD-10-CM

## 2021-12-07 DIAGNOSIS — O26.899 OTHER SPECIFIED PREGNANCY RELATED CONDITIONS, UNSPECIFIED TRIMESTER: ICD-10-CM

## 2021-12-07 DIAGNOSIS — Z34.80 ENCOUNTER FOR SUPERVISION OF OTHER NORMAL PREGNANCY, UNSPECIFIED TRIMESTER: ICD-10-CM

## 2021-12-07 LAB
AMNISURE ROM (RUPTURE OF MEMBRANES): POSITIVE
APTT BLD: 29.6 SEC — SIGNIFICANT CHANGE UP (ref 27.5–35.5)
BASOPHILS # BLD AUTO: 0.02 K/UL — SIGNIFICANT CHANGE UP (ref 0–0.2)
BASOPHILS NFR BLD AUTO: 0.2 % — SIGNIFICANT CHANGE UP (ref 0–2)
BLD GP AB SCN SERPL QL: SIGNIFICANT CHANGE UP
EOSINOPHIL # BLD AUTO: 0.01 K/UL — SIGNIFICANT CHANGE UP (ref 0–0.5)
EOSINOPHIL NFR BLD AUTO: 0.1 % — SIGNIFICANT CHANGE UP (ref 0–6)
FIBRINOGEN PPP-MCNC: 658 MG/DL — HIGH (ref 290–520)
HCT VFR BLD CALC: 35.5 % — SIGNIFICANT CHANGE UP (ref 34.5–45)
HGB BLD-MCNC: 11.7 G/DL — SIGNIFICANT CHANGE UP (ref 11.5–15.5)
IMM GRANULOCYTES NFR BLD AUTO: 0.3 % — SIGNIFICANT CHANGE UP (ref 0–1.5)
INR BLD: 1.03 RATIO — SIGNIFICANT CHANGE UP (ref 0.88–1.16)
LYMPHOCYTES # BLD AUTO: 1.65 K/UL — SIGNIFICANT CHANGE UP (ref 1–3.3)
LYMPHOCYTES # BLD AUTO: 18 % — SIGNIFICANT CHANGE UP (ref 13–44)
MCHC RBC-ENTMCNC: 29.7 PG — SIGNIFICANT CHANGE UP (ref 27–34)
MCHC RBC-ENTMCNC: 33 GM/DL — SIGNIFICANT CHANGE UP (ref 32–36)
MCV RBC AUTO: 90.1 FL — SIGNIFICANT CHANGE UP (ref 80–100)
MONOCYTES # BLD AUTO: 0.54 K/UL — SIGNIFICANT CHANGE UP (ref 0–0.9)
MONOCYTES NFR BLD AUTO: 5.9 % — SIGNIFICANT CHANGE UP (ref 2–14)
NEUTROPHILS # BLD AUTO: 6.92 K/UL — SIGNIFICANT CHANGE UP (ref 1.8–7.4)
NEUTROPHILS NFR BLD AUTO: 75.5 % — SIGNIFICANT CHANGE UP (ref 43–77)
NRBC # BLD: 0 /100 WBCS — SIGNIFICANT CHANGE UP (ref 0–0)
PLATELET # BLD AUTO: 198 K/UL — SIGNIFICANT CHANGE UP (ref 150–400)
PROTHROM AB SERPL-ACNC: 12.2 SEC — SIGNIFICANT CHANGE UP (ref 10.6–13.6)
RBC # BLD: 3.94 M/UL — SIGNIFICANT CHANGE UP (ref 3.8–5.2)
RBC # FLD: 13.3 % — SIGNIFICANT CHANGE UP (ref 10.3–14.5)
SARS-COV-2 RNA SPEC QL NAA+PROBE: SIGNIFICANT CHANGE UP
WBC # BLD: 9.17 K/UL — SIGNIFICANT CHANGE UP (ref 3.8–10.5)
WBC # FLD AUTO: 9.17 K/UL — SIGNIFICANT CHANGE UP (ref 3.8–10.5)

## 2021-12-07 RX ORDER — SODIUM CHLORIDE 9 MG/ML
1000 INJECTION, SOLUTION INTRAVENOUS
Refills: 0 | Status: DISCONTINUED | OUTPATIENT
Start: 2021-12-07 | End: 2021-12-08

## 2021-12-07 RX ORDER — OXYTOCIN 10 UNIT/ML
333.33 VIAL (ML) INJECTION
Qty: 20 | Refills: 0 | Status: DISCONTINUED | OUTPATIENT
Start: 2021-12-07 | End: 2021-12-10

## 2021-12-07 RX ORDER — CITRIC ACID/SODIUM CITRATE 300-500 MG
15 SOLUTION, ORAL ORAL EVERY 6 HOURS
Refills: 0 | Status: DISCONTINUED | OUTPATIENT
Start: 2021-12-07 | End: 2021-12-08

## 2021-12-07 RX ORDER — SODIUM CHLORIDE 9 MG/ML
1000 INJECTION INTRAMUSCULAR; INTRAVENOUS; SUBCUTANEOUS
Refills: 0 | Status: DISCONTINUED | OUTPATIENT
Start: 2021-12-07 | End: 2021-12-08

## 2021-12-07 RX ADMIN — SODIUM CHLORIDE 125 MILLILITER(S): 9 INJECTION, SOLUTION INTRAVENOUS at 19:15

## 2021-12-07 NOTE — PATIENT PROFILE OB - FALL HARM RISK - HARM RISK INTERVENTIONS

## 2021-12-07 NOTE — PATIENT PROFILE OB - FUNCTIONAL ASSESSMENT - BASIC MOBILITY PT AGE POP HIDDEN
Abdomen soft, non-tender and non-distended without organomegaly or masses. Normal bowel sounds.
Adult

## 2021-12-07 NOTE — PATIENT PROFILE OB - FUNCTIONAL ASSESSMENT - DAILY ACTIVITY PT AGE POP HIDDEN
narcotic pain medicine is constipating, buy over the counter stool softener and take as instructed on the bottle Adult

## 2021-12-07 NOTE — PATIENT PROFILE OB - FUNCTIONAL ASSESSMENT - BASIC MOBILITY 5.
Pt w/ L ankle pain, likely mild sprain, mild lateral pain to palpation on exam and some swelling but still ROM at baseline, pt still able to bear weight. Will obtain imaging to r/o fx of ankle/foot, admin NSAIDs and tylenol, dc home w/ ace wrap if imaging CODIE [place] 4 = No assist / stand by assistance

## 2021-12-07 NOTE — PATIENT PROFILE OB - BRAND OF COVID-19 VACCINATION
normal... frail appearing. awake, alert, oriented to person, place, time/situation and in no apparent distress. Pfizer dose 1 and 2

## 2021-12-08 ENCOUNTER — APPOINTMENT (OUTPATIENT)
Dept: ANTEPARTUM | Facility: CLINIC | Age: 31
End: 2021-12-08

## 2021-12-08 ENCOUNTER — RESULT REVIEW (OUTPATIENT)
Age: 31
End: 2021-12-08

## 2021-12-08 LAB — T PALLIDUM AB TITR SER: NEGATIVE — SIGNIFICANT CHANGE UP

## 2021-12-08 PROCEDURE — 88307 TISSUE EXAM BY PATHOLOGIST: CPT | Mod: 26

## 2021-12-08 RX ORDER — IBUPROFEN 200 MG
600 TABLET ORAL EVERY 6 HOURS
Refills: 0 | Status: DISCONTINUED | OUTPATIENT
Start: 2021-12-08 | End: 2021-12-10

## 2021-12-08 RX ORDER — HYDROCORTISONE 1 %
1 OINTMENT (GRAM) TOPICAL EVERY 6 HOURS
Refills: 0 | Status: DISCONTINUED | OUTPATIENT
Start: 2021-12-08 | End: 2021-12-10

## 2021-12-08 RX ORDER — SIMETHICONE 80 MG/1
80 TABLET, CHEWABLE ORAL EVERY 4 HOURS
Refills: 0 | Status: DISCONTINUED | OUTPATIENT
Start: 2021-12-08 | End: 2021-12-10

## 2021-12-08 RX ORDER — OXYCODONE HYDROCHLORIDE 5 MG/1
5 TABLET ORAL ONCE
Refills: 0 | Status: DISCONTINUED | OUTPATIENT
Start: 2021-12-08 | End: 2021-12-10

## 2021-12-08 RX ORDER — METFORMIN HYDROCHLORIDE 850 MG/1
1000 TABLET ORAL AT BEDTIME
Refills: 0 | Status: DISCONTINUED | OUTPATIENT
Start: 2021-12-08 | End: 2021-12-08

## 2021-12-08 RX ORDER — DIPHENHYDRAMINE HCL 50 MG
25 CAPSULE ORAL EVERY 6 HOURS
Refills: 0 | Status: DISCONTINUED | OUTPATIENT
Start: 2021-12-08 | End: 2021-12-10

## 2021-12-08 RX ORDER — OXYTOCIN 10 UNIT/ML
VIAL (ML) INJECTION
Qty: 30 | Refills: 0 | Status: DISCONTINUED | OUTPATIENT
Start: 2021-12-08 | End: 2021-12-08

## 2021-12-08 RX ORDER — SODIUM CHLORIDE 9 MG/ML
3 INJECTION INTRAMUSCULAR; INTRAVENOUS; SUBCUTANEOUS EVERY 8 HOURS
Refills: 0 | Status: DISCONTINUED | OUTPATIENT
Start: 2021-12-08 | End: 2021-12-10

## 2021-12-08 RX ORDER — MAGNESIUM HYDROXIDE 400 MG/1
30 TABLET, CHEWABLE ORAL
Refills: 0 | Status: DISCONTINUED | OUTPATIENT
Start: 2021-12-08 | End: 2021-12-10

## 2021-12-08 RX ORDER — ACETAMINOPHEN 500 MG
975 TABLET ORAL
Refills: 0 | Status: DISCONTINUED | OUTPATIENT
Start: 2021-12-08 | End: 2021-12-10

## 2021-12-08 RX ORDER — OXYCODONE HYDROCHLORIDE 5 MG/1
5 TABLET ORAL
Refills: 0 | Status: DISCONTINUED | OUTPATIENT
Start: 2021-12-08 | End: 2021-12-10

## 2021-12-08 RX ORDER — AER TRAVELER 0.5 G/1
1 SOLUTION RECTAL; TOPICAL EVERY 4 HOURS
Refills: 0 | Status: DISCONTINUED | OUTPATIENT
Start: 2021-12-08 | End: 2021-12-10

## 2021-12-08 RX ORDER — TETANUS TOXOID, REDUCED DIPHTHERIA TOXOID AND ACELLULAR PERTUSSIS VACCINE, ADSORBED 5; 2.5; 8; 8; 2.5 [IU]/.5ML; [IU]/.5ML; UG/.5ML; UG/.5ML; UG/.5ML
0.5 SUSPENSION INTRAMUSCULAR ONCE
Refills: 0 | Status: DISCONTINUED | OUTPATIENT
Start: 2021-12-08 | End: 2021-12-10

## 2021-12-08 RX ORDER — OXYTOCIN 10 UNIT/ML
333.33 VIAL (ML) INJECTION
Qty: 20 | Refills: 0 | Status: DISCONTINUED | OUTPATIENT
Start: 2021-12-08 | End: 2021-12-10

## 2021-12-08 RX ORDER — BENZOCAINE 10 %
1 GEL (GRAM) MUCOUS MEMBRANE EVERY 6 HOURS
Refills: 0 | Status: DISCONTINUED | OUTPATIENT
Start: 2021-12-08 | End: 2021-12-10

## 2021-12-08 RX ORDER — DIBUCAINE 1 %
1 OINTMENT (GRAM) RECTAL EVERY 6 HOURS
Refills: 0 | Status: DISCONTINUED | OUTPATIENT
Start: 2021-12-08 | End: 2021-12-10

## 2021-12-08 RX ORDER — KETOROLAC TROMETHAMINE 30 MG/ML
30 SYRINGE (ML) INJECTION ONCE
Refills: 0 | Status: DISCONTINUED | OUTPATIENT
Start: 2021-12-08 | End: 2021-12-08

## 2021-12-08 RX ORDER — IBUPROFEN 200 MG
600 TABLET ORAL EVERY 6 HOURS
Refills: 0 | Status: COMPLETED | OUTPATIENT
Start: 2021-12-08 | End: 2022-11-06

## 2021-12-08 RX ORDER — LANOLIN
1 OINTMENT (GRAM) TOPICAL EVERY 6 HOURS
Refills: 0 | Status: DISCONTINUED | OUTPATIENT
Start: 2021-12-08 | End: 2021-12-10

## 2021-12-08 RX ORDER — PRAMOXINE HYDROCHLORIDE 150 MG/15G
1 AEROSOL, FOAM RECTAL EVERY 4 HOURS
Refills: 0 | Status: DISCONTINUED | OUTPATIENT
Start: 2021-12-08 | End: 2021-12-10

## 2021-12-08 RX ADMIN — SODIUM CHLORIDE 125 MILLILITER(S): 9 INJECTION, SOLUTION INTRAVENOUS at 05:13

## 2021-12-08 RX ADMIN — Medication 30 MILLIGRAM(S): at 13:18

## 2021-12-08 RX ADMIN — Medication 30 MILLIGRAM(S): at 13:53

## 2021-12-08 RX ADMIN — Medication 1 TABLET(S): at 13:18

## 2021-12-08 RX ADMIN — Medication 2 MILLIUNIT(S)/MIN: at 04:38

## 2021-12-08 RX ADMIN — SODIUM CHLORIDE 3 MILLILITER(S): 9 INJECTION INTRAMUSCULAR; INTRAVENOUS; SUBCUTANEOUS at 22:00

## 2021-12-08 RX ADMIN — SODIUM CHLORIDE 125 MILLILITER(S): 9 INJECTION, SOLUTION INTRAVENOUS at 04:00

## 2021-12-08 RX ADMIN — Medication 1 SPRAY(S): at 13:18

## 2021-12-08 RX ADMIN — SODIUM CHLORIDE 3 MILLILITER(S): 9 INJECTION INTRAMUSCULAR; INTRAVENOUS; SUBCUTANEOUS at 14:03

## 2021-12-08 RX ADMIN — Medication 600 MILLIGRAM(S): at 19:01

## 2021-12-08 RX ADMIN — Medication 1000 MILLIUNIT(S)/MIN: at 09:52

## 2021-12-08 RX ADMIN — SODIUM CHLORIDE 125 MILLILITER(S): 9 INJECTION, SOLUTION INTRAVENOUS at 01:00

## 2021-12-08 RX ADMIN — Medication 600 MILLIGRAM(S): at 19:30

## 2021-12-08 RX ADMIN — SODIUM CHLORIDE 125 MILLILITER(S): 9 INJECTION INTRAMUSCULAR; INTRAVENOUS; SUBCUTANEOUS at 03:16

## 2021-12-09 DIAGNOSIS — Z3A.37 37 WEEKS GESTATION OF PREGNANCY: ICD-10-CM

## 2021-12-09 DIAGNOSIS — E11.9 TYPE 2 DIABETES MELLITUS WITHOUT COMPLICATIONS: ICD-10-CM

## 2021-12-09 DIAGNOSIS — F95.9 TIC DISORDER, UNSPECIFIED: ICD-10-CM

## 2021-12-09 DIAGNOSIS — Z34.93 ENCOUNTER FOR SUPERVISION OF NORMAL PREGNANCY, UNSPECIFIED, THIRD TRIMESTER: ICD-10-CM

## 2021-12-09 LAB
HCT VFR BLD CALC: 28.5 % — LOW (ref 34.5–45)
HGB BLD-MCNC: 9.4 G/DL — LOW (ref 11.5–15.5)
MCHC RBC-ENTMCNC: 30.2 PG — SIGNIFICANT CHANGE UP (ref 27–34)
MCHC RBC-ENTMCNC: 33 GM/DL — SIGNIFICANT CHANGE UP (ref 32–36)
MCV RBC AUTO: 91.6 FL — SIGNIFICANT CHANGE UP (ref 80–100)
NRBC # BLD: 0 /100 WBCS — SIGNIFICANT CHANGE UP (ref 0–0)
PLATELET # BLD AUTO: 156 K/UL — SIGNIFICANT CHANGE UP (ref 150–400)
RBC # BLD: 3.11 M/UL — LOW (ref 3.8–5.2)
RBC # FLD: 13.6 % — SIGNIFICANT CHANGE UP (ref 10.3–14.5)
WBC # BLD: 11.82 K/UL — HIGH (ref 3.8–10.5)
WBC # FLD AUTO: 11.82 K/UL — HIGH (ref 3.8–10.5)

## 2021-12-09 RX ORDER — ASCORBIC ACID 60 MG
500 TABLET,CHEWABLE ORAL DAILY
Refills: 0 | Status: DISCONTINUED | OUTPATIENT
Start: 2021-12-09 | End: 2021-12-10

## 2021-12-09 RX ORDER — IBUPROFEN 200 MG
1 TABLET ORAL
Qty: 20 | Refills: 0
Start: 2021-12-09 | End: 2021-12-13

## 2021-12-09 RX ORDER — ASCORBIC ACID 60 MG
1 TABLET,CHEWABLE ORAL
Qty: 30 | Refills: 0
Start: 2021-12-09 | End: 2022-01-07

## 2021-12-09 RX ORDER — FERROUS SULFATE 325(65) MG
325 TABLET ORAL DAILY
Refills: 0 | Status: DISCONTINUED | OUTPATIENT
Start: 2021-12-09 | End: 2021-12-10

## 2021-12-09 RX ORDER — SENNOSIDES/DOCUSATE SODIUM 8.6MG-50MG
2 TABLET ORAL
Qty: 60 | Refills: 0
Start: 2021-12-09 | End: 2022-01-07

## 2021-12-09 RX ADMIN — Medication 500 MILLIGRAM(S): at 12:34

## 2021-12-09 RX ADMIN — Medication 975 MILLIGRAM(S): at 09:00

## 2021-12-09 RX ADMIN — Medication 600 MILLIGRAM(S): at 00:37

## 2021-12-09 RX ADMIN — Medication 600 MILLIGRAM(S): at 13:00

## 2021-12-09 RX ADMIN — SIMETHICONE 80 MILLIGRAM(S): 80 TABLET, CHEWABLE ORAL at 04:46

## 2021-12-09 RX ADMIN — SODIUM CHLORIDE 3 MILLILITER(S): 9 INJECTION INTRAMUSCULAR; INTRAVENOUS; SUBCUTANEOUS at 14:40

## 2021-12-09 RX ADMIN — Medication 600 MILLIGRAM(S): at 12:34

## 2021-12-09 RX ADMIN — Medication 975 MILLIGRAM(S): at 08:31

## 2021-12-09 RX ADMIN — Medication 325 MILLIGRAM(S): at 12:34

## 2021-12-09 RX ADMIN — Medication 1 TABLET(S): at 12:34

## 2021-12-09 RX ADMIN — Medication 600 MILLIGRAM(S): at 19:15

## 2021-12-09 RX ADMIN — SODIUM CHLORIDE 3 MILLILITER(S): 9 INJECTION INTRAMUSCULAR; INTRAVENOUS; SUBCUTANEOUS at 22:00

## 2021-12-09 RX ADMIN — Medication 600 MILLIGRAM(S): at 01:10

## 2021-12-09 RX ADMIN — Medication 600 MILLIGRAM(S): at 18:14

## 2021-12-09 RX ADMIN — SODIUM CHLORIDE 3 MILLILITER(S): 9 INJECTION INTRAMUSCULAR; INTRAVENOUS; SUBCUTANEOUS at 06:41

## 2021-12-10 ENCOUNTER — TRANSCRIPTION ENCOUNTER (OUTPATIENT)
Age: 31
End: 2021-12-10

## 2021-12-10 ENCOUNTER — APPOINTMENT (OUTPATIENT)
Dept: ANTEPARTUM | Facility: CLINIC | Age: 31
End: 2021-12-10

## 2021-12-10 ENCOUNTER — APPOINTMENT (OUTPATIENT)
Dept: OBGYN | Facility: CLINIC | Age: 31
End: 2021-12-10

## 2021-12-10 VITALS
RESPIRATION RATE: 18 BRPM | SYSTOLIC BLOOD PRESSURE: 111 MMHG | HEART RATE: 75 BPM | OXYGEN SATURATION: 97 % | TEMPERATURE: 98 F | DIASTOLIC BLOOD PRESSURE: 70 MMHG

## 2021-12-10 PROCEDURE — 88307 TISSUE EXAM BY PATHOLOGIST: CPT

## 2021-12-10 PROCEDURE — 84112 EVAL AMNIOTIC FLUID PROTEIN: CPT

## 2021-12-10 PROCEDURE — 86850 RBC ANTIBODY SCREEN: CPT

## 2021-12-10 PROCEDURE — 85025 COMPLETE CBC W/AUTO DIFF WBC: CPT

## 2021-12-10 PROCEDURE — 36415 COLL VENOUS BLD VENIPUNCTURE: CPT

## 2021-12-10 PROCEDURE — 86901 BLOOD TYPING SEROLOGIC RH(D): CPT

## 2021-12-10 PROCEDURE — 82962 GLUCOSE BLOOD TEST: CPT

## 2021-12-10 PROCEDURE — 85610 PROTHROMBIN TIME: CPT

## 2021-12-10 PROCEDURE — 86900 BLOOD TYPING SEROLOGIC ABO: CPT

## 2021-12-10 PROCEDURE — 86780 TREPONEMA PALLIDUM: CPT

## 2021-12-10 PROCEDURE — G0463: CPT

## 2021-12-10 PROCEDURE — 85384 FIBRINOGEN ACTIVITY: CPT

## 2021-12-10 PROCEDURE — 59025 FETAL NON-STRESS TEST: CPT

## 2021-12-10 PROCEDURE — 85730 THROMBOPLASTIN TIME PARTIAL: CPT

## 2021-12-10 PROCEDURE — 87635 SARS-COV-2 COVID-19 AMP PRB: CPT

## 2021-12-10 PROCEDURE — 85027 COMPLETE CBC AUTOMATED: CPT

## 2021-12-10 PROCEDURE — 59050 FETAL MONITOR W/REPORT: CPT

## 2021-12-10 RX ADMIN — Medication 500 MILLIGRAM(S): at 11:48

## 2021-12-10 RX ADMIN — Medication 600 MILLIGRAM(S): at 00:23

## 2021-12-10 RX ADMIN — SODIUM CHLORIDE 3 MILLILITER(S): 9 INJECTION INTRAMUSCULAR; INTRAVENOUS; SUBCUTANEOUS at 06:25

## 2021-12-10 RX ADMIN — Medication 325 MILLIGRAM(S): at 11:48

## 2021-12-10 RX ADMIN — Medication 600 MILLIGRAM(S): at 12:15

## 2021-12-10 RX ADMIN — Medication 975 MILLIGRAM(S): at 08:40

## 2021-12-10 RX ADMIN — Medication 600 MILLIGRAM(S): at 11:48

## 2021-12-10 RX ADMIN — Medication 1 TABLET(S): at 11:48

## 2021-12-10 RX ADMIN — Medication 600 MILLIGRAM(S): at 01:00

## 2021-12-10 RX ADMIN — Medication 975 MILLIGRAM(S): at 09:00

## 2021-12-10 NOTE — DISCHARGE NOTE OB - PLAN OF CARE
Continue prenatal vitamins while breastfeeding. Pelvic rest.  No sexual intercourse and  nothing in vagina - ie tampons or douching.  No heavy lifting or strenuous activity.  Motrin as needed for pain. Follow up in office in 6 weeks for post partum check up. Please call for appointment. eat healthy diet  f/u for 6 week glucose tolerance test Please take iron, vitamin C, and prenatal vitamins daily as prescribed . Eat iron fortified foods- ie leafy green vegetables.

## 2021-12-10 NOTE — DISCHARGE NOTE OB - PATIENT PORTAL LINK FT
You can access the FollowMyHealth Patient Portal offered by [facility] by registering at the following website: [email or web address]. By joining [facility]’s FollowMyHealth portal, you will also be able to view your health information using other applications (apps) compatible with our system.

## 2021-12-10 NOTE — DISCHARGE NOTE OB - PROVIDER TOKENS
FREE:[LAST:[[facility]],PHONE:[(   )    -],FAX:[(   )    -],ADDRESS:[[address] [place] [address]],FOLLOWUP:[Routine]],PROVIDER:[TOKEN:[[id number]],FOLLOWUP:[Routine]]

## 2021-12-10 NOTE — DISCHARGE NOTE OB - IF YOU ARE A SMOKER, IT IS IMPORTANT FOR YOUR HEALTH TO STOP SMOKING. PLEASE BE AWARE THAT SECOND HAND SMOKE IS ALSO HARMFUL.
MOM IS ASKING IF ARMANI CAN GET TESTED FOR DIABETES SINCE HER MOM JUST GOT TESTED AND IT RUNS IN THEIR FAMILY. ARMANI CAN COME IN FOR LABS ON Friday MORNING. PLEASE CALL TO SEE IF SHE CAN JUST MAKE AN APPT OR DOES DR Jeannette Jade NEED TO SEE HER FIRST. Statement Selected

## 2021-12-10 NOTE — PROGRESS NOTE ADULT - SUBJECTIVE AND OBJECTIVE BOX
OBGYN PA NOTE PPD2  Patient seen and evaluated at bedside,  resting comfortably w/o any acute  complaints.  Denies fever, HA, CP, SOB, N/V/D, dizziness, palpitations, worsening abdominal pain, worsening vaginal bleeding, or any other concerns.  Ambulating and voiding without difficulty.  Passing flatus and tolerating regular diet.  Attempting to breastfeed.     Vital Signs Last 24 Hrs  T(C): 36.8 (10 Dec 2021 05:21), Max: 36.8 (09 Dec 2021 19:17)  T(F): 98.2 (10 Dec 2021 05:21), Max: 98.2 (09 Dec 2021 19:17)  HR: 75 (10 Dec 2021 05:21) (75 - 90)  BP: 111/70 (10 Dec 2021 05:21) (106/70 - 111/70)  BP(mean): 84 (10 Dec 2021 05:21) (84 - 84)  RR: 18 (10 Dec 2021 05:21) (16 - 18)  SpO2: 97% (10 Dec 2021 05:21) (97% - 99%)    Physical Exam:     Gen: A&Ox 3, NAD  Chest: CTAB/L  Cardiac: S1+S2+ RRR  Breast: Soft, nontender, nonengorged  Abdomen: Soft, nontender, Fundus firm below umbilicus, +BS  Gyn: Mild lochia, repaired  Extremities: Nontender, DTRS 2+, no worsening edema                          9.4    11.82 )-----------( 156      ( 09 Dec 2021 06:46 )             28.5     
PA NOTE:    Patient seen at bedside resting comfortably offers no current complaints. Ambulating and voiding without difficulty.  Passing flatus and tolerating regular diet.  both breast/bottle feeding . Denies HA, CP, SOB, N/V/D, dizziness, palpitations, worsening abdominal pain, worsening vaginal bleeding, or any other concerns.      Vital Signs Last 24 Hrs  T(C): 36.6 (09 Dec 2021 05:30), Max: 37.1 (08 Dec 2021 10:19)  T(F): 97.9 (09 Dec 2021 05:30), Max: 98.8 (08 Dec 2021 10:19)  HR: 87 (09 Dec 2021 05:30) (75 - 97)  BP: 94/60 (09 Dec 2021 05:30) (91/51 - 104/57)  BP(mean): 68 (08 Dec 2021 10:36) (68 - 70)  RR: 18 (09 Dec 2021 05:30) (16 - 20)  SpO2: 99% (09 Dec 2021 05:30) (98% - 99%)    Physical Exam:     Gen: A&Ox 3, NAD  Chest: CTA B/L  Cardiac: S1+S2; RRR  Breast: Soft, nontender, nonengorged  Abdomen: +Bs; Soft, nontender,  ND; Fundus firm below umbilicus  Gyn: mod lochia, intact/repaired  Ext: Nontender, DTRS 2+, no worsening edema

## 2021-12-10 NOTE — DISCHARGE NOTE OB - CARE PROVIDER_API CALL
[facility],   [address] [place], [place]  Phone: (   )    -  Fax: (   )    -  Follow Up Time: Routine    Merlyn Nieto)  Obstetrics and Gynecology  [address] [facility], [place] [address]  Phone: [phone]  Fax: [phone]  Follow Up Time: Routine

## 2021-12-10 NOTE — DISCHARGE NOTE OB - NS MD DC FALL RISK RISK
For information on Fall & Injury Prevention, visit: [email or web address] OR  [email or web address] OR  https://www.cdc.gov/steadi/patient.html

## 2021-12-10 NOTE — LACTATION INITIAL EVALUATION - LACTATION INTERVENTIONS
scheduled for d/c home today with baby; provided with mother-baby breastfeeding and d/c education at pt's bedside both verbal and written; pt. verbalized understanding of education provided; declined need for Breast pump Referral/initiate/review hand expression/initiate/review techniques for position and latch/review techniques to increase milk supply/review techniques to manage sore nipples/engorgement/reviewed components of an effective feeding and at least 8 effective feedings per day required/reviewed importance of monitoring infant diapers, the breastfeeding log, and minimum output each day/reviewed benefits and recommendations for rooming in/reviewed feeding on demand/by cue at least 8 times a day/reviewed indications of inadequate milk transfer that would require supplementation
Breastfeeding on cue 8-12X/24 hours with diaper count to assess for adequate intake, safe skin to skin and rooming-in encouraged./initiate/review techniques for position and latch/review techniques to manage sore nipples/engorgement/reviewed importance of monitoring infant diapers, the breastfeeding log, and minimum output each day/reviewed risks of unnecessary formula supplementation/reviewed risks of artificial nipples/reviewed benefits and recommendations for rooming in/reviewed feeding on demand/by cue at least 8 times a day

## 2021-12-10 NOTE — LACTATION INITIAL EVALUATION - INTERVENTION OUTCOME
verbalizes understanding/demonstrates understanding of teaching/needs met
verbalizes understanding/demonstrates understanding of teaching/Lactation team to follow up

## 2021-12-10 NOTE — DISCHARGE NOTE OB - CARE PLAN
1 Principal Discharge DX:	 (normal spontaneous vaginal delivery)  Assessment and plan of treatment:	Continue prenatal vitamins while breastfeeding. Pelvic rest.  No sexual intercourse and  nothing in vagina - ie tampons or douching.  No heavy lifting or strenuous activity.  Motrin as needed for pain. Follow up in office in 6 weeks for post partum check up. Please call for appointment.  Secondary Diagnosis:	Gestational diabetes mellitus, class A2  Assessment and plan of treatment:	eat healthy diet  f/u for 6 week glucose tolerance test  Secondary Diagnosis:	Acute on chronic blood loss anemia  Assessment and plan of treatment:	Please take iron, vitamin C, and prenatal vitamins daily as prescribed . Eat iron fortified foods- ie leafy green vegetables.

## 2021-12-10 NOTE — DISCHARGE NOTE OB - HOSPITAL COURSE
patient admitted for premature rupture of membranes and GDMA2 on metformin  patient delivered vaginally  otherwise routine post partum courses  patient discharged in stable condition

## 2021-12-10 NOTE — PROGRESS NOTE ADULT - ASSESSMENT
A/P:  PPD#1 s/p   seen w dr chavez   in the room   -Continue pain management  -Encourage OOB and ambulation                        9.4    11.82 )-----------( 156      ( 09 Dec 2021 06:46 )             28.5     -Continue current care  -Plan for discharge tomorrow  -d/w dr chavez
A/P:  31y F Postpartum day two s/p  @38w2d, admitted for prom,  GDMA2, currently  in stable condition  -d/c home  -Discharge instructions given. Patient verbalize understanding    d/w gen Covarrubias attending

## 2021-12-10 NOTE — DISCHARGE NOTE OB - MEDICATION SUMMARY - MEDICATIONS TO TAKE
I will START or STAY ON the medications listed below when I get home from the hospital:    ibuprofen 600 mg oral tablet  -- 1 tab(s) by mouth every 6 hours, As needed, Mild pain or headache  -- Indication: For pain    Prenatal Multivitamins with Folic Acid 1 mg oral tablet  -- 1 tab(s) by mouth once a day  -- Indication: For Supplementation    Olga-Colace 50 mg-8.6 mg oral tablet  -- 2 tab(s) by mouth once a day (at bedtime)   -- Medication should be taken with plenty of water.    -- Indication: For Stool softener    ascorbic acid 500 mg oral tablet  -- 1 tab(s) by mouth once a day  -- Indication: For Supplementation

## 2021-12-10 NOTE — DISCHARGE NOTE OB - CARE PROVIDERS DIRECT ADDRESSES
,DirectAddress_Unknown,[email or web address]

## 2021-12-11 NOTE — PATIENT PROFILE ADULT - NSPROGENOTHERPROVIDER_GEN_A_NUR
Pt resting quietly watching tv with spouse at bedside. Pain 4/10, denies any nausea. Ready for CT.   none

## 2022-01-04 LAB — SURGICAL PATHOLOGY STUDY: SIGNIFICANT CHANGE UP

## 2022-02-08 ENCOUNTER — APPOINTMENT (OUTPATIENT)
Dept: OBGYN | Facility: CLINIC | Age: 32
End: 2022-02-08
Payer: MEDICAID

## 2022-02-08 ENCOUNTER — OUTPATIENT (OUTPATIENT)
Dept: OUTPATIENT SERVICES | Facility: HOSPITAL | Age: 32
LOS: 1 days | End: 2022-02-08
Payer: MEDICAID

## 2022-02-08 VITALS
BODY MASS INDEX: 29.26 KG/M2 | HEART RATE: 82 BPM | WEIGHT: 145.13 LBS | TEMPERATURE: 97.3 F | HEIGHT: 59 IN | OXYGEN SATURATION: 98 % | SYSTOLIC BLOOD PRESSURE: 103 MMHG | RESPIRATION RATE: 18 BRPM | DIASTOLIC BLOOD PRESSURE: 71 MMHG

## 2022-02-08 DIAGNOSIS — F95.9 TIC DISORDER, UNSPECIFIED: ICD-10-CM

## 2022-02-08 DIAGNOSIS — O09.299 SUPERVISION OF PREGNANCY WITH OTHER POOR REPRODUCTIVE OR OBSTETRIC HISTORY, UNSPECIFIED TRIMESTER: ICD-10-CM

## 2022-02-08 DIAGNOSIS — Z00.00 ENCOUNTER FOR GENERAL ADULT MEDICAL EXAMINATION W/OUT ABNORMAL FINDINGS: ICD-10-CM

## 2022-02-08 DIAGNOSIS — O99.019 ANEMIA COMPLICATING PREGNANCY, UNSPECIFIED TRIMESTER: ICD-10-CM

## 2022-02-08 DIAGNOSIS — E11.9 TYPE 2 DIABETES MELLITUS W/OUT COMPLICATIONS: ICD-10-CM

## 2022-02-08 DIAGNOSIS — Z34.93 ENCOUNTER FOR SUPERVISION OF NORMAL PREGNANCY, UNSPECIFIED, THIRD TRIMESTER: ICD-10-CM

## 2022-02-08 DIAGNOSIS — Z30.09 ENCOUNTER FOR OTHER GENERAL COUNSELING AND ADVICE ON CONTRACEPTION: ICD-10-CM

## 2022-02-08 DIAGNOSIS — Z34.00 ENCOUNTER FOR SUPERVISION OF NORMAL FIRST PREGNANCY, UNSPECIFIED TRIMESTER: ICD-10-CM

## 2022-02-08 PROCEDURE — 99214 OFFICE O/P EST MOD 30 MIN: CPT

## 2022-02-08 PROCEDURE — G0463: CPT

## 2022-02-08 RX ORDER — FERROUS SULFATE 325(65) MG
325 (65 FE) TABLET ORAL TWICE DAILY
Qty: 60 | Refills: 6 | Status: COMPLETED | COMMUNITY
Start: 2021-05-24 | End: 2022-02-08

## 2022-02-08 RX ORDER — PRENATAL VIT 108/IRON,CB/FOLIC 30 MG-1 MG
30-1 TABLET ORAL
Qty: 30 | Refills: 11 | Status: ACTIVE | COMMUNITY
Start: 2021-05-21 | End: 1900-01-01

## 2022-02-08 RX ORDER — LANCETS 33 GAUGE
EACH MISCELLANEOUS
Qty: 2 | Refills: 0 | Status: COMPLETED | COMMUNITY
Start: 2021-07-15 | End: 2022-02-08

## 2022-02-08 RX ORDER — BLOOD-GLUCOSE METER
KIT MISCELLANEOUS 4 TIMES DAILY
Qty: 150 | Refills: 0 | Status: COMPLETED | COMMUNITY
Start: 2021-07-15 | End: 2022-02-08

## 2022-02-08 RX ORDER — URINE ACETONE TEST STRIPS
STRIP MISCELLANEOUS
Qty: 1 | Refills: 2 | Status: COMPLETED | COMMUNITY
Start: 2021-06-01 | End: 2022-02-08

## 2022-02-08 RX ORDER — BLOOD SUGAR DIAGNOSTIC
STRIP MISCELLANEOUS
Qty: 100 | Refills: 6 | Status: COMPLETED | COMMUNITY
Start: 2021-05-24 | End: 2022-02-08

## 2022-02-08 RX ORDER — ISOPROPYL ALCOHOL 0.7 ML/ML
SWAB TOPICAL
Qty: 1 | Refills: 6 | Status: COMPLETED | COMMUNITY
Start: 2021-05-24 | End: 2022-02-08

## 2022-02-08 RX ORDER — BLOOD-GLUCOSE METER
EACH MISCELLANEOUS
Qty: 1 | Refills: 0 | Status: COMPLETED | COMMUNITY
Start: 2021-05-24 | End: 2022-02-08

## 2022-02-08 RX ORDER — LANCETS
EACH MISCELLANEOUS
Qty: 100 | Refills: 6 | Status: COMPLETED | COMMUNITY
Start: 2021-05-24 | End: 2022-02-08

## 2022-02-08 RX ORDER — METFORMIN ER 500 MG 500 MG/1
500 TABLET ORAL AT BEDTIME
Qty: 60 | Refills: 1 | Status: COMPLETED | COMMUNITY
Start: 2021-06-15 | End: 2022-02-08

## 2022-02-08 NOTE — HISTORY OF PRESENT ILLNESS
[Postpartum Follow Up] : postpartum follow up [Delivery Date: ___] : on [unfilled] [] : delivered by vaginal delivery [Male] : Delivery History: baby boy [Wt. ___] : weighing [unfilled] [Breastfeeding] : currently nursing [Discharge HCT: ___] : hematocrit level was [unfilled] [Discharge HGB: ___] : hemoglobin level was [unfilled] [Complications:___] : complications include: [unfilled] [Last Pap Date: ___] : Last Pap Date: [unfilled] [Rhogam] : Rhogam was not administered [Rubella Vaccine] : Rubella vaccine was not administered [Pertussis Vaccine] : Pertussis vaccine was not administered [BTL] : no tubal ligation [BF with Difficulty] : nursing without difficulty [Resumed Menses] : has not resumed her menses [Resumed [place]] : has not resumed intercourse [Intended Contraception] : the patient does not intended to use contraception postpartum [Back to Normal] : is back to normal in size [Cervix Sample Taken] : cervical sample not taken for a Pap smear [Examination Of The Breasts] : breasts are normal [Awake] : awake [Alert] : alert [Acute Distress] : no acute distress [Mass] : no breast mass [Nipple Discharge] : no nipple discharge [Soft] : soft [Tender] : non tender [Distended] : not distended [Oriented x3] : oriented to person, place, and time [Depressed Mood] : not depressed [Flat Affect] : affect not flat [Labia Majora] : labia major [Labia Minora] : labia minora [Normal] : clitoris [Doing Well] : is doing well [No Sign of Infection] : is showing no signs of infection [None] : None [FreeTextEntry8] : EPDS~10 - consulted with SW today / (+)Bottle/Breastfeeding / Contraception counseled [de-identified] : (+) B/L inner thigh fungal infection, consulted with Dermatologist pre-pregnancy, recurred after delivery will call and f/u again [de-identified] : Postpartum exam / Contraception / Inner thigh Fungal infection [de-identified] : RTO for annual gyn exam / F/U with PCP in 3 months for 2hr GT screening / Condoms for contraception

## 2022-02-14 DIAGNOSIS — Z30.09 ENCOUNTER FOR OTHER GENERAL COUNSELING AND ADVICE ON CONTRACEPTION: ICD-10-CM

## 2022-02-14 DIAGNOSIS — F95.9 TIC DISORDER, UNSPECIFIED: ICD-10-CM

## 2022-02-14 DIAGNOSIS — Z00.00 ENCOUNTER FOR GENERAL ADULT MEDICAL EXAMINATION WITHOUT ABNORMAL FINDINGS: ICD-10-CM

## 2022-02-14 DIAGNOSIS — Z11.9 ENCOUNTER FOR SCREENING FOR INFECTIOUS AND PARASITIC DISEASES, UNSPECIFIED: ICD-10-CM

## 2022-04-12 ENCOUNTER — APPOINTMENT (OUTPATIENT)
Dept: INTERNAL MEDICINE | Facility: CLINIC | Age: 32
End: 2022-04-12

## 2022-11-09 NOTE — ED PROVIDER NOTE - NS HIV RISK FACTOR YES
History of single seizure in 1999, no abnormal EEGs and no recurrent events  She was initially on carbamazepine due to the seizure, however this was weaned off at 1 point  Her migraines returned, therefore carbamazepine was added back for migraine control  No recurrent events concerning for seizure 
Migraines well controlled with carbamazepine 200 mg b i d  Cannot remember when she last had a migraine  In the past, attempts to wean off carbamazepine have caused worsening migraines  She can continue the med  Na has remained normal  If she gets a headache, should avoid NSAIDs and take Tylenol instead 
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, placed on Eliquis 5mg BID at the time  She follows with cardiology  She is also on ASA 81mg daily due to aortic atheroma and intracranial stenosis  She remains on atorvastatin 80mg daily      She denies any new symptoms to indicate recurrent TIA/CVA  No issues with bleeding/bruising      Plan:  - For ongoing stroke prevention patient will continue Eliquis 5 mg b i d , aspirin 81 mg daily, atorvastatin 80 mg daily, along with appropriate blood pressure and glycemic control   -Will defer management of blood pressure, lipids and blood sugar to her PCP   -She will continue to follow with Cardiology as scheduled  -Heart healthy diet and routine exercise as tolerated   -Fall precautions discussed    Patient informed that if she were to have a fall and strike her head, she needs to be seen in the ED urgently for evaluation given her use of anticoagulation  -Signs and symptoms of stroke reviewed and included in AVS today
Declined

## 2023-10-09 NOTE — DISCHARGE NOTE OB - AVOID SEXUAL ACTIVITY UNTIL YOUR POSTPARTUM VISIT
Detail Level: Zone Detail Level: Detailed Detail Level: Simple Prescription Strength Graduated Compression Stockings Recommendations: The patient was counseled that prescription strength graduated compression stockings should be worn for all waking hours. They will follow up with a venous specialist to monitor graduated compression stocking usage and their symptoms. Statement Selected

## 2024-03-07 NOTE — DISCHARGE NOTE ADULT - COMPLETE THE FOLLOWING IF THE PATIENT REFUSES THE INFLUENZA VACCINE:
Subjective:     Kady Mares is a 37 y.o. female who presents for   Chief Complaint   Patient presents with    Medication Refill    Hypertension    Anxiety    Pre-diabetes    Obesity       HPI    Hypertension: The patient has been not taking medications regularly as instructed. She reports no chest pain on exertion, no dyspnea on exertion, and no swelling of ankles.    BP Readings from Last 3 Encounters:   03/07/24 (!) 158/90   02/06/24 (!) 147/95   03/21/23 130/82     Anxiety: She presents for follow up of anxiety disorder. Current symptoms: none. She denies current suicidal and homicidal ideation. She complains of the following side effects from the treatment: none. She feels that Effexor works well.    Pre-diabetes: Patient has a family history of diabetes. Her weight has been stable.  The patient has not make any changes to diet. She has never taken medications to reduce the risk for progression to diabetes. She denies excessive urination, no excessive thirst and no excessive hunger.     Lab Results   Component Value Date    HGBA1C 5.9 (H) 03/07/2024    HGBA1C 5.9 (H) 03/07/2024     03/07/2024     Body mass index is 41.81 kg/m².  Wt Readings from Last 3 Encounters:   03/07/24 103.7 kg (228 lb 9.9 oz)   02/06/24 100.7 kg (222 lb)   03/21/23 103.9 kg (229 lb 0.9 oz)   - skips meals due to erratic work schedule  - skips BF, L- salad bowl, yogurt and Butterfinger, D usually late at night and heavy  - loves swimming, joined weight management group with [employer]      STOP-Bang Questionnaire         YES             NO        x  Snoring?  Do you snore loudly (loud enough to be heard through closed doors, or your bed partner elbows you for snoring at night)?         x  Tired?  Do you often feel tired, fatigued, or sleepy during the daytime (such as falling asleep during driving)?          x Observed?  Has anyone observed you stop breathing or choking/gasping during your sleep?         x   Pressure?  Do you have or are being treated for high blood pressure?       x  Body mass index more than 35 kg/m2?            x Age older than 50 years old?           X (16 in)  Neck size large? (measured around Dada's apple)  For male, is your shirt collar 17 inches or larger?  For female, is your shirt collar 16 inches or larger?         x Gender = Male?             Review of Systems   Constitutional:  Negative for activity change, chills, diaphoresis, fever and unexpected weight change.   HENT:  Negative for congestion, ear pain, hearing loss, rhinorrhea, sinus pressure and trouble swallowing.    Eyes:  Positive for visual disturbance. Negative for discharge.   Respiratory:  Negative for cough, shortness of breath and wheezing.         She snores but is unaware if she stops breathing   Cardiovascular:  Positive for chest pain. Negative for palpitations.   Gastrointestinal:  Negative for abdominal pain, blood in stool, constipation, diarrhea, nausea and vomiting.   Endocrine: Negative for polydipsia and polyuria.   Genitourinary:  Negative for difficulty urinating, dysuria, hematuria and menstrual problem.   Musculoskeletal:  Positive for arthralgias. Negative for joint swelling, myalgias and neck pain.   Skin:  Negative for rash and wound.   Neurological:  Positive for headaches. Negative for dizziness, tremors and weakness.   Psychiatric/Behavioral:  Positive for dysphoric mood. Negative for confusion. The patient is nervous/anxious.           Answers submitted by the patient for this visit:  Review of Systems Questionnaire (Submitted on 3/1/2024)  activity change: No  unexpected weight change: No  neck pain: No  hearing loss: No  rhinorrhea: No  trouble swallowing: No  eye discharge: No  visual disturbance: Yes  chest tightness: No  wheezing: No  chest pain: Yes  palpitations: No  blood in stool: No  constipation: No  vomiting: No  diarrhea: No  polydipsia: No  polyuria: No  difficulty urinating: No  hematuria:  No  menstrual problem: No  dysuria: No  joint swelling: No  arthralgias: Yes  headaches: Yes  weakness: No  confusion: No  dysphoric mood: Yes    Objective:     Physical Exam  Vitals reviewed.   Constitutional:       General: She is awake. She is not in acute distress.     Appearance: Normal appearance. She is well-developed and well-groomed.   HENT:      Head: Normocephalic and atraumatic.      Right Ear: Hearing and external ear normal.      Left Ear: Hearing and external ear normal.      Nose: Nose normal. No congestion.      Mouth/Throat:      Mouth: Mucous membranes are moist.   Eyes:      General: Lids are normal. Vision grossly intact.   Cardiovascular:      Rate and Rhythm: Normal rate and regular rhythm.      Heart sounds: Normal heart sounds. No murmur heard.  Pulmonary:      Effort: Pulmonary effort is normal.      Breath sounds: Normal breath sounds. No decreased breath sounds or wheezing.   Abdominal:      General: Bowel sounds are normal. There is no distension.   Musculoskeletal:         General: Normal range of motion.      Cervical back: Normal range of motion.      Right lower leg: No edema.      Left lower leg: No edema.   Skin:     General: Skin is warm and dry.      Findings: No lesion or rash.   Neurological:      Mental Status: She is alert and oriented to person, place, and time.   Psychiatric:         Attention and Perception: Attention normal.         Mood and Affect: Mood normal.         Behavior: Behavior is cooperative.            Assessment:      1. Essential hypertension    2. Anxiety    3. Prediabetes    4. Vitamin D insufficiency    5. At risk for sleep apnea    6. Morbid obesity with BMI of 40.0-44.9, adult           Plan:     1. Essential hypertension  - stop lisinopril, patient interested in starting a family in 1 year  - start labetalol 100mg bid, monitor BP closely and call us with readings  - labetaloL (NORMODYNE) 100 MG tablet; Take 1 tablet (100 mg total) by mouth 2 (two)  times daily.  Dispense: 60 tablet; Refill: 0    2. Anxiety  - controlled, continue Effexor for now  - effexor may be associated with pre-eclampsia and post-partum hemorrhage, will check with GYN and consider changing medication to something safer    3. Prediabetes  - check HbA1c    4. Vitamin D insufficiency  - resume Vitamin D weekly  - ergocalciferol (ERGOCALCIFEROL) 50,000 unit Cap; Take 1 capsule (50,000 Units total) by mouth every 7 days.  Dispense: 12 capsule; Refill: 3    5. At risk for sleep apnea  - high risk based on STOPBANG, plans to lose weight    6. Morbid obesity with BMI of 40.0-44.9, adult  - medications are no an option based on insurance, will address with HR  - INSULIN, RANDOM; Future      RTC in 1-2 months for annual exam or sooner if needed    __________________________    Diamond Vasquez MD, PharmD  [facility]- Internal Medicine  American Board of Obesity Medicine diplomate  Office [phone]           Risks/benefits discussed with patient/surrogate

## 2024-05-01 NOTE — PROGRESS NOTE ADULT - PROBLEM/PLAN-2
DISPLAY PLAN FREE TEXT
DISPLAY PLAN FREE TEXT
18 y/o female with PMHx of presents to the ED c/o worsening constant, bilateral flank pain for the past 2 weeks. Pt reports she has had persistent pain since discharge from the hospital for bilateral pyelo, followed-up with [facility], had US done 2 days ago that was normal. Pt was scheduled for CMG and cystoscopy however was uncomfortable with male urologist doing it so cancelled. no fevers, no chills, no IVDA, no urinary incontinence, no bowel incontinence,
18 y/o female with PMHx of presents to the ED c/o worsening constant, bilateral flank pain for the past 2 months. Pt reports she has had persistent pain since discharge from the hospital for bilateral pyelo, followed-up with [facility], had US done 2 days ago that was normal. Pt was scheduled for CMG and cystoscopy however was uncomfortable with male urologist doing it so cancelled. Denies fevers, chills, IVDA, urinary incontinence, bowel incontinence, hematuria, dysuria, increased urinary urgency or frequency, vaginal bleeding or discharge, abdominal pain, numbness tingling weakness

## 2024-08-22 ENCOUNTER — APPOINTMENT (OUTPATIENT)
Dept: OBGYN | Facility: CLINIC | Age: 34
End: 2024-08-22
Payer: COMMERCIAL

## 2024-08-22 VITALS
OXYGEN SATURATION: 99 % | HEIGHT: 59 IN | RESPIRATION RATE: 18 BRPM | BODY MASS INDEX: 30.24 KG/M2 | SYSTOLIC BLOOD PRESSURE: 119 MMHG | WEIGHT: 150 LBS | DIASTOLIC BLOOD PRESSURE: 81 MMHG | HEART RATE: 87 BPM | TEMPERATURE: 97.5 F

## 2024-08-22 DIAGNOSIS — Z01.411 ENCOUNTER FOR GYNECOLOGICAL EXAMINATION (GENERAL) (ROUTINE) WITH ABNORMAL FINDINGS: ICD-10-CM

## 2024-08-22 DIAGNOSIS — N89.8 OTHER SPECIFIED NONINFLAMMATORY DISORDERS OF VAGINA: ICD-10-CM

## 2024-08-22 DIAGNOSIS — N90.89 OTHER SPECIFIED NONINFLAMMATORY DISORDERS OF VULVA AND PERINEUM: ICD-10-CM

## 2024-08-22 PROCEDURE — 99213 OFFICE O/P EST LOW 20 MIN: CPT

## 2024-08-22 RX ORDER — CLOTRIMAZOLE AND BETAMETHASONE DIPROPIONATE 10; .5 MG/G; MG/G
1-0.05 CREAM TOPICAL TWICE DAILY
Qty: 1 | Refills: 0 | Status: ACTIVE | COMMUNITY
Start: 2024-08-22 | End: 1900-01-01

## 2024-08-22 NOTE — PLAN
[FreeTextEntry1] : Treated empirically with clotrimazole BMZ Pelvic ultrasound referral provided, follow-up in office for results 3 to 5 days post ultrasound Will notify of any abnormal lab findings Home

## 2024-08-22 NOTE — HISTORY OF PRESENT ILLNESS
[TextBox_4] : Intermittent lower pelvic pain accompanied by cervical mucus periodically for the last few months Pain comes and goes, usually before a menstrual cycle Patient is sexually active, using condoms but not all the time.  LMP 8/2/2024, reports monthly menses Patient also complains of external vaginal itching Pap due today [PapSmeardate] : 3/5/21 [TextBox_31] : wnl [HPVDate] : 3/5/21 [TextBox_78] : neg [FreeTextEntry1] : 08/02/24 [Currently Active] : currently active [Men] : men [Vaginal] : vaginal [No] : No

## 2024-08-22 NOTE — PHYSICAL EXAM
[Chaperone Present] : A chaperone was present in the examining room during all aspects of the physical examination [21275] : A chaperone was present during the pelvic exam. [FreeTextEntry2] : Marissa JUNE [Appropriately responsive] : appropriately responsive [Soft] : soft [Non-tender] : non-tender [Non-distended] : non-distended [No HSM] : No HSM [No Lesions] : no lesions [Oriented x3] : oriented x3 [Examination Of The Breasts] : a normal appearance [No Masses] : no breast masses were palpable [Labia Majora] : normal [Labia Minora] : normal [Normal] : normal [Uterine Adnexae] : normal [FreeTextEntry1] : Small right labial lesion, cultured for HSV

## 2024-08-23 LAB
C TRACH RRNA SPEC QL NAA+PROBE: NOT DETECTED
N GONORRHOEA RRNA SPEC QL NAA+PROBE: NOT DETECTED
SOURCE TP AMPLIFICATION: NORMAL

## 2024-08-26 LAB
A VAGINAE DNA VAG QL NAA+PROBE: NORMAL
BACTERIA UR CULT: NORMAL
BVAB2 DNA VAG QL NAA+PROBE: NORMAL
C KRUSEI DNA VAG QL NAA+PROBE: NEGATIVE
C TRACH RRNA SPEC QL NAA+PROBE: NEGATIVE
CANDIDA DNA VAG QL NAA+PROBE: NEGATIVE
HHV SPEC CULT: NORMAL
HPV HIGH+LOW RISK DNA PNL CVX: NOT DETECTED
HSV TYPE 1: NORMAL
HSV TYPE 2: NORMAL
MEGA1 DNA VAG QL NAA+PROBE: NORMAL
N GONORRHOEA RRNA SPEC QL NAA+PROBE: NEGATIVE
T VAGINALIS RRNA SPEC QL NAA+PROBE: NEGATIVE

## 2024-08-27 LAB — CYTOLOGY CVX/VAG DOC THIN PREP: NORMAL

## 2024-08-28 ENCOUNTER — APPOINTMENT (OUTPATIENT)
Dept: ULTRASOUND IMAGING | Facility: CLINIC | Age: 34
End: 2024-08-28
Payer: COMMERCIAL

## 2024-08-28 PROCEDURE — 76856 US EXAM PELVIC COMPLETE: CPT

## 2024-08-28 PROCEDURE — 76830 TRANSVAGINAL US NON-OB: CPT

## 2024-09-01 ENCOUNTER — EMERGENCY (EMERGENCY)
Facility: HOSPITAL | Age: 34
LOS: 1 days | Discharge: HOME | End: 2024-09-01
Attending: EMERGENCY MEDICINE | Admitting: EMERGENCY MEDICINE
Payer: COMMERCIAL

## 2024-09-01 VITALS
WEIGHT: 149.91 LBS | TEMPERATURE: 98 F | DIASTOLIC BLOOD PRESSURE: 85 MMHG | RESPIRATION RATE: 18 BRPM | HEART RATE: 95 BPM | OXYGEN SATURATION: 100 % | SYSTOLIC BLOOD PRESSURE: 121 MMHG

## 2024-09-01 VITALS
RESPIRATION RATE: 18 BRPM | OXYGEN SATURATION: 100 % | SYSTOLIC BLOOD PRESSURE: 108 MMHG | DIASTOLIC BLOOD PRESSURE: 75 MMHG | HEART RATE: 86 BPM | TEMPERATURE: 98 F

## 2024-09-01 LAB
ADD ON TEST-SPECIMEN IN LAB: SIGNIFICANT CHANGE UP
ADD ON TEST-SPECIMEN IN LAB: SIGNIFICANT CHANGE UP
ALBUMIN SERPL ELPH-MCNC: 4.3 G/DL — SIGNIFICANT CHANGE UP (ref 3.3–5)
ALP SERPL-CCNC: 59 U/L — SIGNIFICANT CHANGE UP (ref 40–120)
ALT FLD-CCNC: 10 U/L — SIGNIFICANT CHANGE UP (ref 4–33)
ANION GAP SERPL CALC-SCNC: 16 MMOL/L — HIGH (ref 7–14)
AST SERPL-CCNC: 16 U/L — SIGNIFICANT CHANGE UP (ref 4–32)
BASOPHILS # BLD AUTO: 0.04 K/UL — SIGNIFICANT CHANGE UP (ref 0–0.2)
BASOPHILS NFR BLD AUTO: 0.4 % — SIGNIFICANT CHANGE UP (ref 0–2)
BILIRUB SERPL-MCNC: 0.2 MG/DL — SIGNIFICANT CHANGE UP (ref 0.2–1.2)
BUN SERPL-MCNC: 7 MG/DL — SIGNIFICANT CHANGE UP (ref 7–23)
CALCIUM SERPL-MCNC: 9.6 MG/DL — SIGNIFICANT CHANGE UP (ref 8.4–10.5)
CHLORIDE SERPL-SCNC: 100 MMOL/L — SIGNIFICANT CHANGE UP (ref 98–107)
CO2 SERPL-SCNC: 21 MMOL/L — LOW (ref 22–31)
CREAT SERPL-MCNC: 0.48 MG/DL — LOW (ref 0.5–1.3)
EGFR: 127 ML/MIN/1.73M2 — SIGNIFICANT CHANGE UP
EOSINOPHIL # BLD AUTO: 0.03 K/UL — SIGNIFICANT CHANGE UP (ref 0–0.5)
EOSINOPHIL NFR BLD AUTO: 0.3 % — SIGNIFICANT CHANGE UP (ref 0–6)
GLUCOSE SERPL-MCNC: 135 MG/DL — HIGH (ref 70–99)
HCT VFR BLD CALC: 32 % — LOW (ref 34.5–45)
HGB BLD-MCNC: 10.3 G/DL — LOW (ref 11.5–15.5)
IANC: 8.46 K/UL — HIGH (ref 1.8–7.4)
IMM GRANULOCYTES NFR BLD AUTO: 0.3 % — SIGNIFICANT CHANGE UP (ref 0–0.9)
LIDOCAIN IGE QN: 28 U/L — SIGNIFICANT CHANGE UP (ref 7–60)
LYMPHOCYTES # BLD AUTO: 1.67 K/UL — SIGNIFICANT CHANGE UP (ref 1–3.3)
LYMPHOCYTES # BLD AUTO: 15.5 % — SIGNIFICANT CHANGE UP (ref 13–44)
MCHC RBC-ENTMCNC: 27.6 PG — SIGNIFICANT CHANGE UP (ref 27–34)
MCHC RBC-ENTMCNC: 32.2 GM/DL — SIGNIFICANT CHANGE UP (ref 32–36)
MCV RBC AUTO: 85.8 FL — SIGNIFICANT CHANGE UP (ref 80–100)
MONOCYTES # BLD AUTO: 0.52 K/UL — SIGNIFICANT CHANGE UP (ref 0–0.9)
MONOCYTES NFR BLD AUTO: 4.8 % — SIGNIFICANT CHANGE UP (ref 2–14)
NEUTROPHILS # BLD AUTO: 8.46 K/UL — HIGH (ref 1.8–7.4)
NEUTROPHILS NFR BLD AUTO: 78.7 % — HIGH (ref 43–77)
NRBC # BLD: 0 /100 WBCS — SIGNIFICANT CHANGE UP (ref 0–0)
NRBC # FLD: 0 K/UL — SIGNIFICANT CHANGE UP (ref 0–0)
PLATELET # BLD AUTO: 298 K/UL — SIGNIFICANT CHANGE UP (ref 150–400)
POTASSIUM SERPL-MCNC: 3.8 MMOL/L — SIGNIFICANT CHANGE UP (ref 3.5–5.3)
POTASSIUM SERPL-SCNC: 3.8 MMOL/L — SIGNIFICANT CHANGE UP (ref 3.5–5.3)
PROT SERPL-MCNC: 7.9 G/DL — SIGNIFICANT CHANGE UP (ref 6–8.3)
RBC # BLD: 3.73 M/UL — LOW (ref 3.8–5.2)
RBC # FLD: 13.2 % — SIGNIFICANT CHANGE UP (ref 10.3–14.5)
SODIUM SERPL-SCNC: 137 MMOL/L — SIGNIFICANT CHANGE UP (ref 135–145)
WBC # BLD: 10.75 K/UL — HIGH (ref 3.8–10.5)
WBC # FLD AUTO: 10.75 K/UL — HIGH (ref 3.8–10.5)

## 2024-09-01 PROCEDURE — 99284 EMERGENCY DEPT VISIT MOD MDM: CPT

## 2024-09-01 RX ORDER — FAMOTIDINE 10 MG/ML
1 INJECTION INTRAVENOUS
Qty: 28 | Refills: 0
Start: 2024-09-01 | End: 2024-09-14

## 2024-09-01 RX ORDER — ONDANSETRON 2 MG/ML
1 INJECTION, SOLUTION INTRAMUSCULAR; INTRAVENOUS
Qty: 21 | Refills: 0
Start: 2024-09-01 | End: 2024-09-07

## 2024-09-01 RX ORDER — SUCRALFATE 1 G/10ML
1 SUSPENSION ORAL
Qty: 21 | Refills: 0
Start: 2024-09-01 | End: 2024-09-07

## 2024-09-01 RX ORDER — ONDANSETRON 2 MG/ML
4 INJECTION, SOLUTION INTRAMUSCULAR; INTRAVENOUS ONCE
Refills: 0 | Status: COMPLETED | OUTPATIENT
Start: 2024-09-01 | End: 2024-09-01

## 2024-09-01 RX ORDER — SODIUM CHLORIDE 9 MG/ML
1000 INJECTION INTRAMUSCULAR; INTRAVENOUS; SUBCUTANEOUS ONCE
Refills: 0 | Status: COMPLETED | OUTPATIENT
Start: 2024-09-01 | End: 2024-09-01

## 2024-09-01 RX ORDER — FAMOTIDINE 10 MG/ML
20 INJECTION INTRAVENOUS ONCE
Refills: 0 | Status: COMPLETED | OUTPATIENT
Start: 2024-09-01 | End: 2024-09-01

## 2024-09-01 RX ADMIN — FAMOTIDINE 20 MILLIGRAM(S): 10 INJECTION INTRAVENOUS at 02:52

## 2024-09-01 RX ADMIN — SODIUM CHLORIDE 1000 MILLILITER(S): 9 INJECTION INTRAMUSCULAR; INTRAVENOUS; SUBCUTANEOUS at 02:53

## 2024-09-01 RX ADMIN — ONDANSETRON 4 MILLIGRAM(S): 2 INJECTION, SOLUTION INTRAMUSCULAR; INTRAVENOUS at 02:52

## 2024-09-01 NOTE — ED PROVIDER NOTE - PATIENT PORTAL LINK FT
You can access the FollowMyHealth Patient Portal offered by [facility] by registering at the following website: [email or web address]. By joining [facility]’s FollowMyHealth portal, you will also be able to view your health information using other applications (apps) compatible with our system.

## 2024-09-01 NOTE — ED PROVIDER NOTE - CLINICAL SUMMARY MEDICAL DECISION MAKING FREE TEXT BOX
33-year-old female with past medical history of acid reflux presents to emergency department for evaluation of epigastric abdominal pain onset last evening. 33-year-old female with past medical history of acid reflux presents to emergency department for evaluation of epigastric abdominal pain onset last evening. No EtOH or marijuana use, low suspicion for pancreatitis, will evaluate with labs, treat nausea, give fluids and if workup negative, will PO trial. If able to tolerate PO will d/c with medications, patient has endoscopy scheduled and GI follow up established.

## 2024-09-01 NOTE — ED ADULT NURSE NOTE - OBJECTIVE STATEMENT
pt A&Ox3 c/o epigastric pain intermittenly x 1 year worsening over past few months. c/o nausea and vomiting, worsened when eating. pt due for endoscopy by gastroenterology MD. labs drawn. pt evaluated by MD. pending results and disposition.

## 2024-09-01 NOTE — ED PROVIDER NOTE - PROGRESS NOTE DETAILS
Sivan PGY-2: patient tolerated PO, feeling better. will d/c with sucralfate, zofran and pepcid. patient has GI followup

## 2024-09-01 NOTE — ED PROVIDER NOTE - ATTENDING CONTRIBUTION TO CARE
This is a 33-year-old female the past medical history of acid reflux presenting to the emergency department for epigastric abdominal pain.  Patient states that she has had similar symptoms in the past which were her reflux she was referred to see GI and she has an endoscopy scheduled mid-September.  Patient states that she has been taking omeprazole however tonight the pain was very bad.  Patient did not try anything additional for this pain.  Patient admits to eating a lot of spicy food.  She states her pain is worse at night when she lays down to sleep.  Patient was told to avoid eating just prior to bed to avoid spicy and heavy food she will be sent a prescription for antacids over-the-counter she was told she can continue taking her omeprazole and to use these as needed for recurrence of this pain.  She was given strict return precautions.  She does have endoscopy scheduled.  She feels much better she is very eager to go home at this time.

## 2024-09-01 NOTE — ED PROVIDER NOTE - OBJECTIVE STATEMENT
33-year-old female with past medical history of acid reflux presents to emergency department for evaluation of epigastric abdominal pain onset last evening.  Patient states she has had similar symptoms in the past, has seen her PCP for the symptoms, was referred to gastroenterologist and has an endoscopy as an outpatient scheduled for September 11.  Patient states when she has these symptoms, she typically takes omeprazole and symptoms resolved.  Last evening symptoms persisted into this morning prompting presentation.  Denies fevers, chills, hematemesis, diarrhea, cough, constipation, back pain or urinary symptoms.  Food does not make her symptoms better or worse.  No family at home with similar symptoms.  No recent travel.

## 2024-09-01 NOTE — ED PROVIDER NOTE - NSFOLLOWUPINSTRUCTIONS_ED_ALL_ED_FT
Today you were seen in the emergency department for evaluation of abdominal pain.     We suspect your symptoms are related to your stomach, and your endoscopy will help differentiate what the cause is.     We sent medications to your pharmacy to help with your symptoms. Please take these as prescribed.     SEEK IMMEDIATE MEDICAL CARE IF YOU HAVE ANY OF THE FOLLOWING SYMPTOMS: worsening abdominal pain, uncontrollable vomiting, profuse diarrhea, inability to have bowel movements or pass gas, black or bloody stools, fever accompanying chest pain or back pain, or fainting. These symptoms may represent a serious problem that is an emergency. Do not wait to see if the symptoms will go away. Get medical help right away. Call 911 and do not drive yourself to the hospital.

## 2024-09-01 NOTE — ED ADULT TRIAGE NOTE - NSTRIAGECARE_GEN_A_ER
PAST MEDICAL HISTORY:   Hypertension.  Hyperlipidemia.  Gastroesophageal reflux disease.  Chronic rhinitis.  BPH.  Left bundle-branch block.  Adenomatous colon polyp in the year 2000 and in August 2015.  Right inguinal hernia repair.  Epididymal cyst.  Fractured elbow and wrist.  Lung surgery at age 38 to remove an empyema.        MEDICATIONS:   Atorvastatin 10 mg.  Benazepril 10 mg.  Diltiazem 360 mg.  Doxazosin 4 mg.  Hydrochlorothiazide 25 mg.      76-year-old male    Routine follow-up check    In the interim had an upper endoscopy that was normal other than minimal gastric erythema, with normal biopsy.  Colonoscopy was performed which revealed 2 polyps that was hyperplastic.    In regard to constipation, he is doing well with current Linzess dose of 72 mg in taking Metamucil.  He is now having regular formed bowel function daily    Review of symptoms  Reports no chest pain, abdominal pain, shortness of breath heartburn indigestion  Has some nasal congestion, relieved with Allegra  No difficulty urinating urine flows fine    Examination  Weight 211 lb  Pulse 92  Blood pressure 132/80  Neck no thyromegaly no masses  Chest clear breath sounds  Heart regular rate and rhythm  Abdominal exam nontender soft no hepatosplenomegaly abdominal masses      Additional information, he expressed a concern as last PSA was 1.2 but previously was 0.84 but within the years he has had a fluctuated from 0.84-1.2, but the mid set times he might feel a perineal discomfort      Impression  Hypertension  Hyperlipidemia  BPH  Perineal discomfort         Plan  Chemistry, lipid, diagnostic PSA  Recommend Shingrix and Tdap  Continue with attention to diet physical activity            EKG

## 2024-09-01 NOTE — ED ADULT NURSE NOTE - NSFALLUNIVINTERV_ED_ALL_ED
Bed/Stretcher in lowest position, wheels locked, appropriate side rails in place/Call bell, personal items and telephone in reach/Instruct patient to call for assistance before getting out of bed/chair/stretcher/Non-slip footwear applied when patient is off stretcher/[place] to call system/Physically safe environment - no spills, clutter or unnecessary equipment/Purposeful proactive rounding/Room/bathroom lighting operational, light cord in reach No

## 2024-09-01 NOTE — ED PROVIDER NOTE - PHYSICAL EXAMINATION
Gen: uncomfortable appearing   Head: normal appearing  HEENT: normal conjunctiva, oral mucosa moist, vision grossly intact   Lung: no respiratory distress, speaking in full sentences, CTA b/l, no wheeze, crackles or rhonchi   CV: regular rate and rhythm, no murmurs  Abd: soft, non distended with epigastric tenderness, no CVA tenderness   MSK: no visible deformities, ambulating without difficulty   Neuro: No focal deficits, AAOx3  Skin: Warm, no rashes   Psych: normal affect

## 2024-09-07 ENCOUNTER — EMERGENCY (EMERGENCY)
Facility: HOSPITAL | Age: 34
LOS: 1 days | Discharge: LEFT BEFORE DISCHARGE | End: 2024-09-07
Admitting: EMERGENCY MEDICINE
Payer: COMMERCIAL

## 2024-09-07 VITALS
WEIGHT: 149.91 LBS | SYSTOLIC BLOOD PRESSURE: 133 MMHG | DIASTOLIC BLOOD PRESSURE: 84 MMHG | OXYGEN SATURATION: 100 % | HEART RATE: 79 BPM | TEMPERATURE: 98 F | RESPIRATION RATE: 20 BRPM | HEIGHT: 60 IN

## 2024-09-07 PROCEDURE — 93010 ELECTROCARDIOGRAM REPORT: CPT

## 2024-09-07 PROCEDURE — L9991: CPT

## 2024-09-07 NOTE — ED ADULT TRIAGE NOTE - CHIEF COMPLAINT QUOTE
pt c/o epigastric pain, nausea vomiting x 1 day. has been seen for same problem before and reports meds not helping. LMP current Azithromycin Pregnancy And Lactation Text: This medication is considered safe during pregnancy and is also secreted in breast milk.

## 2024-09-08 ENCOUNTER — INPATIENT (INPATIENT)
Facility: HOSPITAL | Age: 34
LOS: 1 days | Discharge: HOME | End: 2024-09-10
Attending: STUDENT IN AN ORGANIZED HEALTH CARE EDUCATION/TRAINING PROGRAM | Admitting: STUDENT IN AN ORGANIZED HEALTH CARE EDUCATION/TRAINING PROGRAM
Payer: COMMERCIAL

## 2024-09-08 ENCOUNTER — TRANSCRIPTION ENCOUNTER (OUTPATIENT)
Age: 34
End: 2024-09-08

## 2024-09-08 VITALS
HEART RATE: 99 BPM | SYSTOLIC BLOOD PRESSURE: 102 MMHG | OXYGEN SATURATION: 100 % | DIASTOLIC BLOOD PRESSURE: 69 MMHG | RESPIRATION RATE: 15 BRPM | WEIGHT: 149.91 LBS | HEIGHT: 60 IN | TEMPERATURE: 98 F

## 2024-09-08 LAB
ALBUMIN SERPL ELPH-MCNC: 4.8 G/DL — SIGNIFICANT CHANGE UP (ref 3.3–5)
ALP SERPL-CCNC: 77 U/L — SIGNIFICANT CHANGE UP (ref 40–120)
ALT FLD-CCNC: 12 U/L — SIGNIFICANT CHANGE UP (ref 4–33)
ANION GAP SERPL CALC-SCNC: 14 MMOL/L — SIGNIFICANT CHANGE UP (ref 7–14)
AST SERPL-CCNC: 13 U/L — SIGNIFICANT CHANGE UP (ref 4–32)
BASOPHILS # BLD AUTO: 0.03 K/UL — SIGNIFICANT CHANGE UP (ref 0–0.2)
BASOPHILS NFR BLD AUTO: 0.3 % — SIGNIFICANT CHANGE UP (ref 0–2)
BILIRUB SERPL-MCNC: 0.2 MG/DL — SIGNIFICANT CHANGE UP (ref 0.2–1.2)
BUN SERPL-MCNC: 4 MG/DL — LOW (ref 7–23)
CALCIUM SERPL-MCNC: 9.6 MG/DL — SIGNIFICANT CHANGE UP (ref 8.4–10.5)
CHLORIDE SERPL-SCNC: 101 MMOL/L — SIGNIFICANT CHANGE UP (ref 98–107)
CO2 SERPL-SCNC: 23 MMOL/L — SIGNIFICANT CHANGE UP (ref 22–31)
CREAT SERPL-MCNC: 0.52 MG/DL — SIGNIFICANT CHANGE UP (ref 0.5–1.3)
EGFR: 124 ML/MIN/1.73M2 — SIGNIFICANT CHANGE UP
EOSINOPHIL # BLD AUTO: 0.02 K/UL — SIGNIFICANT CHANGE UP (ref 0–0.5)
EOSINOPHIL NFR BLD AUTO: 0.2 % — SIGNIFICANT CHANGE UP (ref 0–6)
GLUCOSE SERPL-MCNC: 98 MG/DL — SIGNIFICANT CHANGE UP (ref 70–99)
HCG SERPL-ACNC: <1 MIU/ML — SIGNIFICANT CHANGE UP
HCT VFR BLD CALC: 34.6 % — SIGNIFICANT CHANGE UP (ref 34.5–45)
HGB BLD-MCNC: 10.8 G/DL — LOW (ref 11.5–15.5)
IANC: 6.45 K/UL — SIGNIFICANT CHANGE UP (ref 1.8–7.4)
IMM GRANULOCYTES NFR BLD AUTO: 0.2 % — SIGNIFICANT CHANGE UP (ref 0–0.9)
LIDOCAIN IGE QN: 34 U/L — SIGNIFICANT CHANGE UP (ref 7–60)
LYMPHOCYTES # BLD AUTO: 3.07 K/UL — SIGNIFICANT CHANGE UP (ref 1–3.3)
LYMPHOCYTES # BLD AUTO: 30.2 % — SIGNIFICANT CHANGE UP (ref 13–44)
MAGNESIUM SERPL-MCNC: 2.2 MG/DL — SIGNIFICANT CHANGE UP (ref 1.6–2.6)
MCHC RBC-ENTMCNC: 27.2 PG — SIGNIFICANT CHANGE UP (ref 27–34)
MCHC RBC-ENTMCNC: 31.2 GM/DL — LOW (ref 32–36)
MCV RBC AUTO: 87.2 FL — SIGNIFICANT CHANGE UP (ref 80–100)
MONOCYTES # BLD AUTO: 0.58 K/UL — SIGNIFICANT CHANGE UP (ref 0–0.9)
MONOCYTES NFR BLD AUTO: 5.7 % — SIGNIFICANT CHANGE UP (ref 2–14)
NEUTROPHILS # BLD AUTO: 6.45 K/UL — SIGNIFICANT CHANGE UP (ref 1.8–7.4)
NEUTROPHILS NFR BLD AUTO: 63.4 % — SIGNIFICANT CHANGE UP (ref 43–77)
NRBC # BLD: 0 /100 WBCS — SIGNIFICANT CHANGE UP (ref 0–0)
NRBC # FLD: 0 K/UL — SIGNIFICANT CHANGE UP (ref 0–0)
PHOSPHATE SERPL-MCNC: 2.6 MG/DL — SIGNIFICANT CHANGE UP (ref 2.5–4.5)
PLATELET # BLD AUTO: 358 K/UL — SIGNIFICANT CHANGE UP (ref 150–400)
POTASSIUM SERPL-MCNC: 3.3 MMOL/L — LOW (ref 3.5–5.3)
POTASSIUM SERPL-SCNC: 3.3 MMOL/L — LOW (ref 3.5–5.3)
PROT SERPL-MCNC: 8.3 G/DL — SIGNIFICANT CHANGE UP (ref 6–8.3)
RBC # BLD: 3.97 M/UL — SIGNIFICANT CHANGE UP (ref 3.8–5.2)
RBC # FLD: 13.4 % — SIGNIFICANT CHANGE UP (ref 10.3–14.5)
SODIUM SERPL-SCNC: 138 MMOL/L — SIGNIFICANT CHANGE UP (ref 135–145)
WBC # BLD: 10.17 K/UL — SIGNIFICANT CHANGE UP (ref 3.8–10.5)
WBC # FLD AUTO: 10.17 K/UL — SIGNIFICANT CHANGE UP (ref 3.8–10.5)

## 2024-09-08 PROCEDURE — 76705 ECHO EXAM OF ABDOMEN: CPT | Mod: 26

## 2024-09-08 PROCEDURE — 99285 EMERGENCY DEPT VISIT HI MDM: CPT

## 2024-09-08 RX ORDER — ONDANSETRON 2 MG/ML
4 INJECTION, SOLUTION INTRAMUSCULAR; INTRAVENOUS ONCE
Refills: 0 | Status: COMPLETED | OUTPATIENT
Start: 2024-09-08 | End: 2024-09-08

## 2024-09-08 RX ORDER — MAGNESIUM, ALUMINUM HYDROXIDE 200-225/5
30 SUSPENSION, ORAL (FINAL DOSE FORM) ORAL ONCE
Refills: 0 | Status: COMPLETED | OUTPATIENT
Start: 2024-09-08 | End: 2024-09-08

## 2024-09-08 RX ORDER — FAMOTIDINE 10 MG/ML
20 INJECTION INTRAVENOUS ONCE
Refills: 0 | Status: COMPLETED | OUTPATIENT
Start: 2024-09-08 | End: 2024-09-08

## 2024-09-08 RX ORDER — PANTOPRAZOLE SODIUM 40 MG
40 TABLET, DELAYED RELEASE (ENTERIC COATED) ORAL ONCE
Refills: 0 | Status: DISCONTINUED | OUTPATIENT
Start: 2024-09-08 | End: 2024-09-09

## 2024-09-08 RX ADMIN — Medication 30 MILLILITER(S): at 21:02

## 2024-09-08 RX ADMIN — FAMOTIDINE 20 MILLIGRAM(S): 10 INJECTION INTRAVENOUS at 20:33

## 2024-09-08 RX ADMIN — ONDANSETRON 4 MILLIGRAM(S): 2 INJECTION, SOLUTION INTRAMUSCULAR; INTRAVENOUS at 20:34

## 2024-09-08 NOTE — ED ADULT NURSE NOTE - NSFALLUNIVINTERV_ED_ALL_ED
Bed/Stretcher in lowest position, wheels locked, appropriate side rails in place/Call bell, personal items and telephone in reach/Instruct patient to call for assistance before getting out of bed/chair/stretcher/Non-slip footwear applied when patient is off stretcher/[place] to call system/Physically safe environment - no spills, clutter or unnecessary equipment/Purposeful proactive rounding/Room/bathroom lighting operational, light cord in reach

## 2024-09-08 NOTE — ED ADULT NURSE NOTE - OBJECTIVE STATEMENT
33y female with past medical history of hyperlipidemia c/o of gastric pain. Pt stayed that she is been dealing with this issue foe many month. Pt stated  that she has intermittent vomiting at home. presently denies nausea, diarrhea, dysuria, 20g IV placed R AC. labs obtained and sent to lab

## 2024-09-08 NOTE — ED ADULT TRIAGE NOTE - CHIEF COMPLAINT QUOTE
c/o epigastric pain X 15 days. endorsing N/V. denies diarrhea, LMP 9/2/24. denies any past medical hx.

## 2024-09-08 NOTE — ED PROVIDER NOTE - CLINICAL SUMMARY MEDICAL DECISION MAKING FREE TEXT BOX
Pt with h/o GERD p.w epigastric pain radiating up to through chest and throat, worse with lying down and at night time. Also reports some n/v. Had similar ED visit on 9/1, felt better after meds and was dc'ed to f/u with gastroenterology. Pt saw her gastro and is getting an endoscopy on 9/11. She is here because the meds she was prescribed on last visit are no longer working and feels she cant sleep 2/2 pain. On exam, HDS, afeb, some TTP to epigastric and RUQ. Pt is concerned symptoms may be 2/2 gallstone, will obtain RUQ US, labs, symptom management.

## 2024-09-08 NOTE — ED ADULT NURSE NOTE - NSSUHOSCREENINGYN_ED_ALL_ED
Patient is scheduled for an appointment on 7/5/18 and would like to have labs done prior to their appointment.  Patient will come in fasting 3 to 7 days before appointment to have labs done.  Please check on or place orders.    
Spoke to patient, labs are in and ready for them. Patient has been informed.   
Yes - the patient is able to be screened

## 2024-09-08 NOTE — ED PROVIDER NOTE - PHYSICAL EXAMINATION
GEN - NAD; well appearing; A+O x3   HEAD - NC/AT   ENT: Airway patent, mmm  NECK: Neck supple  PULMONARY - CTA b/l, symmetric breath sounds.   CARDIAC -s1s2, RRR, no M,G,R  ABDOMEN - +BS, ND, TTP epigastric/RUQ, soft, no guarding, no rebound, no masses   BACK - no CVA tenderness, Normal  spine   EXTREMITIES - FROM  NEUROLOGIC - alert, speech clear  PSYCH -nl mood/affect, nl insight.

## 2024-09-08 NOTE — ED ADULT NURSE NOTE - NS ED NOTE  TALK SOMEONE YN
Patient has a positive pregnancy test.  Quant HCG 20,700  Progesterone and repeat Quant and OB Panel ordered.   OB Intake scheduled   
No

## 2024-09-08 NOTE — ED ADULT NURSE REASSESSMENT NOTE - NS ED NURSE REASSESS COMMENT FT1
pt remains at baseline mental status A&OX4, RR even unlabored completing full clear sentences. pt resting in stretcher comfortably at this time, no new complaints offered. stretcher lowest position siderails up safety measures in place. pt awaiting US results at this time

## 2024-09-09 ENCOUNTER — APPOINTMENT (OUTPATIENT)
Dept: OBGYN | Facility: CLINIC | Age: 34
End: 2024-09-09

## 2024-09-09 ENCOUNTER — RESULT REVIEW (OUTPATIENT)
Age: 34
End: 2024-09-09

## 2024-09-09 ENCOUNTER — TRANSCRIPTION ENCOUNTER (OUTPATIENT)
Age: 34
End: 2024-09-09

## 2024-09-09 DIAGNOSIS — K81.0 ACUTE CHOLECYSTITIS: ICD-10-CM

## 2024-09-09 LAB
ALBUMIN SERPL ELPH-MCNC: 4 G/DL — SIGNIFICANT CHANGE UP (ref 3.3–5)
ALP SERPL-CCNC: 67 U/L — SIGNIFICANT CHANGE UP (ref 40–120)
ALT FLD-CCNC: 8 U/L — SIGNIFICANT CHANGE UP (ref 4–33)
ANION GAP SERPL CALC-SCNC: 12 MMOL/L — SIGNIFICANT CHANGE UP (ref 7–14)
AST SERPL-CCNC: 11 U/L — SIGNIFICANT CHANGE UP (ref 4–32)
BILIRUB SERPL-MCNC: 0.3 MG/DL — SIGNIFICANT CHANGE UP (ref 0.2–1.2)
BUN SERPL-MCNC: 4 MG/DL — LOW (ref 7–23)
CALCIUM SERPL-MCNC: 9.1 MG/DL — SIGNIFICANT CHANGE UP (ref 8.4–10.5)
CHLORIDE SERPL-SCNC: 103 MMOL/L — SIGNIFICANT CHANGE UP (ref 98–107)
CO2 SERPL-SCNC: 24 MMOL/L — SIGNIFICANT CHANGE UP (ref 22–31)
CREAT SERPL-MCNC: 0.6 MG/DL — SIGNIFICANT CHANGE UP (ref 0.5–1.3)
EGFR: 120 ML/MIN/1.73M2 — SIGNIFICANT CHANGE UP
GLUCOSE SERPL-MCNC: 96 MG/DL — SIGNIFICANT CHANGE UP (ref 70–99)
HCT VFR BLD CALC: 31.3 % — LOW (ref 34.5–45)
HGB BLD-MCNC: 9.9 G/DL — LOW (ref 11.5–15.5)
MAGNESIUM SERPL-MCNC: 2.2 MG/DL — SIGNIFICANT CHANGE UP (ref 1.6–2.6)
MCHC RBC-ENTMCNC: 27.7 PG — SIGNIFICANT CHANGE UP (ref 27–34)
MCHC RBC-ENTMCNC: 31.6 GM/DL — LOW (ref 32–36)
MCV RBC AUTO: 87.4 FL — SIGNIFICANT CHANGE UP (ref 80–100)
NRBC # BLD: 0 /100 WBCS — SIGNIFICANT CHANGE UP (ref 0–0)
NRBC # FLD: 0 K/UL — SIGNIFICANT CHANGE UP (ref 0–0)
PHOSPHATE SERPL-MCNC: 3.1 MG/DL — SIGNIFICANT CHANGE UP (ref 2.5–4.5)
PLATELET # BLD AUTO: 314 K/UL — SIGNIFICANT CHANGE UP (ref 150–400)
POTASSIUM SERPL-MCNC: 3.5 MMOL/L — SIGNIFICANT CHANGE UP (ref 3.5–5.3)
POTASSIUM SERPL-SCNC: 3.5 MMOL/L — SIGNIFICANT CHANGE UP (ref 3.5–5.3)
PROT SERPL-MCNC: 7.1 G/DL — SIGNIFICANT CHANGE UP (ref 6–8.3)
RBC # BLD: 3.58 M/UL — LOW (ref 3.8–5.2)
RBC # FLD: 13.3 % — SIGNIFICANT CHANGE UP (ref 10.3–14.5)
SODIUM SERPL-SCNC: 139 MMOL/L — SIGNIFICANT CHANGE UP (ref 135–145)
WBC # BLD: 7.53 K/UL — SIGNIFICANT CHANGE UP (ref 3.8–10.5)
WBC # FLD AUTO: 7.53 K/UL — SIGNIFICANT CHANGE UP (ref 3.8–10.5)

## 2024-09-09 PROCEDURE — 99222 1ST HOSP IP/OBS MODERATE 55: CPT | Mod: 57,GC

## 2024-09-09 PROCEDURE — 88304 TISSUE EXAM BY PATHOLOGIST: CPT | Mod: 26

## 2024-09-09 PROCEDURE — 47562 LAPAROSCOPIC CHOLECYSTECTOMY: CPT | Mod: GC

## 2024-09-09 DEVICE — CLIP APPLIER TELEFLEX WECK AUTO 5MM X 35CM: Type: IMPLANTABLE DEVICE | Status: FUNCTIONAL

## 2024-09-09 DEVICE — CLIP LIGATING VESOLOCK MED/LG GRN: Type: IMPLANTABLE DEVICE | Status: FUNCTIONAL

## 2024-09-09 RX ORDER — PIPERACILLIN SODIUM AND TAZOBACTAM SODIUM 3; .375 G/15ML; G/15ML
3.38 INJECTION, POWDER, FOR SOLUTION INTRAVENOUS ONCE
Refills: 0 | Status: COMPLETED | OUTPATIENT
Start: 2024-09-09 | End: 2024-09-09

## 2024-09-09 RX ORDER — OXYCODONE HYDROCHLORIDE 5 MG/1
2.5 TABLET ORAL EVERY 6 HOURS
Refills: 0 | Status: DISCONTINUED | OUTPATIENT
Start: 2024-09-09 | End: 2024-09-10

## 2024-09-09 RX ORDER — ENOXAPARIN SODIUM 100 MG/ML
40 INJECTION SUBCUTANEOUS EVERY 24 HOURS
Refills: 0 | Status: DISCONTINUED | OUTPATIENT
Start: 2024-09-09 | End: 2024-09-10

## 2024-09-09 RX ORDER — PIPERACILLIN SODIUM AND TAZOBACTAM SODIUM 3; .375 G/15ML; G/15ML
3.38 INJECTION, POWDER, FOR SOLUTION INTRAVENOUS EVERY 8 HOURS
Refills: 0 | Status: DISCONTINUED | OUTPATIENT
Start: 2024-09-09 | End: 2024-09-09

## 2024-09-09 RX ORDER — POTASSIUM PHOSPHATE 236; 224 MG/ML; MG/ML
30 INJECTION, SOLUTION INTRAVENOUS ONCE
Refills: 0 | Status: DISCONTINUED | OUTPATIENT
Start: 2024-09-09 | End: 2024-09-09

## 2024-09-09 RX ORDER — PANTOPRAZOLE SODIUM 40 MG
40 TABLET, DELAYED RELEASE (ENTERIC COATED) ORAL DAILY
Refills: 0 | Status: DISCONTINUED | OUTPATIENT
Start: 2024-09-09 | End: 2024-09-09

## 2024-09-09 RX ORDER — ENOXAPARIN SODIUM 100 MG/ML
40 INJECTION SUBCUTANEOUS EVERY 24 HOURS
Refills: 0 | Status: DISCONTINUED | OUTPATIENT
Start: 2024-09-09 | End: 2024-09-09

## 2024-09-09 RX ORDER — HYDROMORPHONE HYDROCHLORIDE 2 MG/1
0.5 TABLET ORAL
Refills: 0 | Status: DISCONTINUED | OUTPATIENT
Start: 2024-09-09 | End: 2024-09-09

## 2024-09-09 RX ORDER — HEPARIN SODIUM,BOVINE 1000/ML
5000 VIAL (ML) INJECTION EVERY 8 HOURS
Refills: 0 | Status: DISCONTINUED | OUTPATIENT
Start: 2024-09-09 | End: 2024-09-09

## 2024-09-09 RX ORDER — OXYCODONE HYDROCHLORIDE 5 MG/1
5 TABLET ORAL EVERY 6 HOURS
Refills: 0 | Status: DISCONTINUED | OUTPATIENT
Start: 2024-09-09 | End: 2024-09-10

## 2024-09-09 RX ORDER — ACETAMINOPHEN 325 MG/1
975 TABLET ORAL EVERY 6 HOURS
Refills: 0 | Status: DISCONTINUED | OUTPATIENT
Start: 2024-09-09 | End: 2024-09-10

## 2024-09-09 RX ADMIN — PIPERACILLIN SODIUM AND TAZOBACTAM SODIUM 200 GRAM(S): 3; .375 INJECTION, POWDER, FOR SOLUTION INTRAVENOUS at 02:24

## 2024-09-09 RX ADMIN — HYDROMORPHONE HYDROCHLORIDE 0.5 MILLIGRAM(S): 2 TABLET ORAL at 12:36

## 2024-09-09 RX ADMIN — HYDROMORPHONE HYDROCHLORIDE 0.5 MILLIGRAM(S): 2 TABLET ORAL at 13:00

## 2024-09-09 RX ADMIN — OXYCODONE HYDROCHLORIDE 5 MILLIGRAM(S): 5 TABLET ORAL at 18:57

## 2024-09-09 RX ADMIN — Medication 5000 UNIT(S): at 06:40

## 2024-09-09 RX ADMIN — PIPERACILLIN SODIUM AND TAZOBACTAM SODIUM 25 GRAM(S): 3; .375 INJECTION, POWDER, FOR SOLUTION INTRAVENOUS at 06:10

## 2024-09-09 NOTE — DISCHARGE NOTE PROVIDER - HOSPITAL COURSE
33 year old female with no PMHx presents with 1-day of abdominal pain, nausea without vomiting. Patient presented to the ED with prior episode that was discharged with protonix that didn't help. The patient confirms the pain is related to food intake and not relieved by protonix or pain medications.    In the ED, the patient is afebrile, non-tachy, normotensive and saturating well on RA.     RUQ US performed and showed Cholelithiasis with nonmobile stone in the neck and wall thickening. Unreliable sonographic Vasquez sign. Findings are critical for acute   cholecystitis.     Pt admitted to surgical service and taken to the OR for.......Pt tolerated procedure well.    Post op diet advanced as tolerated.    Per Attending pt now stable for discharge home. Pt tolerating diet and pain well controlled. Pt to follow up with surgeon as an outpatient, instructed to call to schedule appointment 33 year old female with no PMHx presents with 1-day of abdominal pain, nausea without vomiting. Patient presented to the ED with prior episode that was discharged with protonix that didn't help. The patient confirms the pain is related to food intake and not relieved by protonix or pain medications.    In the ED, the patient is afebrile, non-tachy, normotensive and saturating well on RA.     RUQ US performed and showed Cholelithiasis with nonmobile stone in the neck and wall thickening. Unreliable sonographic Vasquez sign. Findings are critical for acute   cholecystitis.     Pt admitted to surgical service and taken to the OR for laparoscopic cholecystectomy. Pt tolerated procedure well.    Post op diet advanced as tolerated.    Per Attending pt now stable for discharge home. Pt tolerating diet and pain well controlled. Pt to follow up with surgeon as an outpatient, instructed to call to schedule appointment

## 2024-09-09 NOTE — ED ADULT NURSE REASSESSMENT NOTE - CONDITION
Patient is evaluated by surgery resident. Signed consent to have gallbladder removed. Instructed not to eat or drink anything by resident. Family member at bedside. NAD noted. Will continue to monitor.

## 2024-09-09 NOTE — CHART NOTE - NSCHARTNOTEFT_GEN_A_CORE
POST-OPERATIVE NOTE    Subjective:  Patient is s/p laparoscopic cholecystectomy. Patient's  reports that she is still sleepy from the surgery. Patient reports some pain but mostly just reports feeling tired. She was nauseous earlier but that has since resolved and does not want any antiemetics currently. She denies chest pain or shortness of breath. She has not had anything to eat yet reporting she is still sleepy.     Vital Signs Last 24 Hrs  T(C): 36.5 (09 Sep 2024 15:06), Max: 37.2 (09 Sep 2024 03:15)  T(F): 97.7 (09 Sep 2024 15:06), Max: 98.9 (09 Sep 2024 03:15)  HR: 77 (09 Sep 2024 15:06) (75 - 101)  BP: 115/72 (09 Sep 2024 15:06) (89/61 - 131/73)  BP(mean): 77 (09 Sep 2024 14:30) (68 - 104)  RR: 18 (09 Sep 2024 15:06) (12 - 18)  SpO2: 96% (09 Sep 2024 15:06) (94% - 100%)    Parameters below as of 09 Sep 2024 15:06  Patient On (Oxygen Delivery Method): room air      I&O's Detail    08 Sep 2024 07:01  -  09 Sep 2024 07:00  --------------------------------------------------------  IN:    Lactated Ringers: 548 mL  Total IN: 548 mL    OUT:    Voided (mL): 425 mL  Total OUT: 425 mL    Total NET: 123 mL      09 Sep 2024 07:01  -  09 Sep 2024 16:39  --------------------------------------------------------  IN:    Oral Fluid: 100 mL  Total IN: 100 mL    OUT:  Total OUT: 0 mL    Total NET: 100 mL        MEDICATIONS    PAST MEDICAL & SURGICAL HISTORY:  No pertinent past medical history      No significant past surgical history    Physical Exam:  General: NAD, resting comfortably in bed, somnolent   Pulmonary: Nonlabored breathing, no respiratory distress  Abdominal: soft, appropriately tender  Skin: Incisions c/d/i, dermabond in over skin incisions.      LABS:                        9.9    7.53  )-----------( 314      ( 09 Sep 2024 07:35 )             31.3     09-09    139  |  103  |  4<L>  ----------------------------<  96  3.5   |  24  |  0.60    Ca    9.1      09 Sep 2024 07:35  Phos  3.1     09-09  Mg     2.20     09-09    TPro  7.1  /  Alb  4.0  /  TBili  0.3  /  DBili  x   /  AST  11  /  ALT  8   /  AlkPhos  67  09-09      CAPILLARY BLOOD GLUCOSE      Radiology and Additional Studies:    Assessment:  The patient is a 33 year old female with recurrent history of biliary cholic who is now several hours post-op from a laparoscopic cholecystectomy. Patient still somnolent likely from anesthesia, recovering well otherwise. Has not had a diet yet.     Plan:  - Pain control as needed  - DVT ppx: Lovenox   - Regular Diet: Halal  - OOB and ambulating as tolerated  - Patient can go home if tolerating a diet and ambulating  - F/u AM Labs if patient stays admitted.    B Team Surgery   #[id number]

## 2024-09-09 NOTE — PATIENT PROFILE ADULT - FALL HARM RISK - UNIVERSAL INTERVENTIONS
Bed in lowest position, wheels locked, appropriate side rails in place/Call bell, personal items and telephone in reach/Instruct patient to call for assistance before getting out of bed or chair/Non-slip footwear when patient is out of bed/[place] to call system/Physically safe environment - no spills, clutter or unnecessary equipment/Purposeful Proactive Rounding/Room/bathroom lighting operational, light cord in reach

## 2024-09-09 NOTE — DISCHARGE NOTE PROVIDER - NSDCCPCAREPLAN_GEN_ALL_CORE_FT
PRINCIPAL DISCHARGE DIAGNOSIS  Diagnosis: Acute cholecystitis  Assessment and Plan of Treatment: WOUND CARE:  Please keep incisions clean and dry. Please do not Scrub or rub incisions. Do not use lotion or powder on incisions.   BATHING: You may shower and/or sponge bathe. You may use warm soapy water in the shower and rinse, pat dry.  ACTIVITY: No heavy lifting or straining. Otherwise, you may return to your usual level of physical activity. If you are taking narcotic pain medication DO NOT drive a car, operate machinery or make important decisions.  DIET: Return to your usual diet.  NOTIFY YOUR SURGEON IF YOU HAVE: any bleeding that does not stop, any pus draining from your wound(s), any fever (over 100.4 F) persistent nausea/vomiting, or if your pain is not controlled on your discharge pain medications, unable to urinate.  FOLLOW UP:  1. Please follow up with your primary care physician in one week regarding your hospitalization, bring copies of your discharge paperwork.  2. Please follow up with your surgeon, Dr. Dahl as an outpatient, please call to schedule appointment

## 2024-09-09 NOTE — H&P ADULT - NSHPLABSRESULTS_GEN_ALL_CORE
Ophtha referral pended for your approval if ok. Please review and advise. Thank you. .  LABS:                         10.8   10.17 )-----------( 358      ( 08 Sep 2024 20:39 )             34.6     09-08    138  |  101  |  4<L>  ----------------------------<  98  3.3<L>   |  23  |  0.52    Ca    9.6      08 Sep 2024 20:39  Phos  2.6     09-08  Mg     2.20     09-08    TPro  8.3  /  Alb  4.8  /  TBili  0.2  /  DBili  x   /  AST  13  /  ALT  12  /  AlkPhos  77  09-08      Urinalysis Basic - ( 08 Sep 2024 20:39 )    Color: x / Appearance: x / SG: x / pH: x  Gluc: 98 mg/dL / Ketone: x  / Bili: x / Urobili: x   Blood: x / Protein: x / Nitrite: x   Leuk Esterase: x / RBC: x / WBC x   Sq Epi: x / Non Sq Epi: x / Bacteria: x            RADIOLOGY, EKG & ADDITIONAL TESTS: Reviewed.

## 2024-09-09 NOTE — ED ADULT NURSE REASSESSMENT NOTE - CONDITION
Patient report given to Edgardo floor nurse. A & O x 4. Appears well. Administered IV zosyn as ordered. Awaiting transport. NAD noted.SBP 99. Surgery resident aware.

## 2024-09-09 NOTE — BRIEF OPERATIVE NOTE - OPERATION/FINDINGS
Laparoscopic cholecystectomy. 4 port. Critical view obtained. Cystic duct and artery clipped and divided. Gallbladder dissected off of liver. Hemostasis achieved. No complications.

## 2024-09-09 NOTE — H&P ADULT - ASSESSMENT
A 33 year old female with recurrent history of biliary cholic presents to the ED with abdominal pain localized in the RUQ and US showing a non-mobile stone stuck in the cystic duct.     Plan:  - Admit patient to surgery  - Added to the OR for robotic-assisted laparoscopic cholecystectomy possible open  - Consented  - Med Rec done  - NPO  - IVF  - IV antibiotics  - Pain medications PRN  - DVT PPx  - Protonix     B-Team  [id number]

## 2024-09-09 NOTE — DISCHARGE NOTE PROVIDER - CARE PROVIDER_API CALL
Shoaib Dahl [facility]  Surgery  [id number] [place] [id number]  Phone: [phone]  Fax: [phone]  Follow Up Time:

## 2024-09-09 NOTE — DISCHARGE NOTE PROVIDER - NSDCMRMEDTOKEN_GEN_ALL_CORE_FT
ascorbic acid 500 mg oral tablet: 1 tab(s) orally once a day  ibuprofen 600 mg oral tablet: 1 tab(s) orally every 6 hours, As needed, Mild pain or headache  Olga-Colace 50 mg-8.6 mg oral tablet: 2 tab(s) orally once a day (at bedtime)

## 2024-09-09 NOTE — H&P ADULT - NSHPPHYSICALEXAM_GEN_ALL_CORE
General: NAD  HEENT: Atrumatic, supple neck  Resp: Non-labored breathing  Card: RRR  Abd: soft, ND, tender RUQ. No rebound tenderness or guarding.  Extremities: grossly normal

## 2024-09-09 NOTE — H&P ADULT - HISTORY OF PRESENT ILLNESS
A 33 year old female with no PMHx presents with 1-day of abdominal pain, nausea without vomiting. Patient presented to the ED with prior episode that was discharged with protonix that didn't help. The patient confirms the pain is related to food intake and not relieved by protonix or pain medications.    In the ED, the patient is afebrile, non-tachy, normotensive and saturating well on RA.

## 2024-09-09 NOTE — H&P ADULT - ATTENDING COMMENTS
I have reviewed the history, pertinent labs and imaging, and discussed the care with the consult resident.  More than 50% of this 55 minute encounter including face to face with the patient was spent counseling and/or coordination of care on acute cholecystitis.  Time included review of vitals, labs, imaging, discussion with consultants and coordination with the operating room/emergency department.    32yo F with h/o biliary colic presenting with RUQ pain that has not resolved. GS on US. Normal Bili. Clinically consistent with acute cholecystitis.     - NPO, IVF hydration   - abx   - added on for lap vs robo cholecystectomy     The active issues are:  1. acute cholecystitis     The Acute Care Surgery (B Team) Attending Group Practice:  Dr. Jannie Hooper    urgent issues - spectra [id number]  nonurgent issues - [phone]  patient appointments or afterhours - [phone]

## 2024-09-10 ENCOUNTER — TRANSCRIPTION ENCOUNTER (OUTPATIENT)
Age: 34
End: 2024-09-10

## 2024-09-10 VITALS
HEART RATE: 73 BPM | OXYGEN SATURATION: 100 % | RESPIRATION RATE: 18 BRPM | DIASTOLIC BLOOD PRESSURE: 72 MMHG | SYSTOLIC BLOOD PRESSURE: 113 MMHG | TEMPERATURE: 98 F

## 2024-09-10 LAB
ALBUMIN SERPL ELPH-MCNC: 3.7 G/DL — SIGNIFICANT CHANGE UP (ref 3.3–5)
ALP SERPL-CCNC: 67 U/L — SIGNIFICANT CHANGE UP (ref 40–120)
ALT FLD-CCNC: 15 U/L — SIGNIFICANT CHANGE UP (ref 4–33)
ANION GAP SERPL CALC-SCNC: 13 MMOL/L — SIGNIFICANT CHANGE UP (ref 7–14)
AST SERPL-CCNC: 27 U/L — SIGNIFICANT CHANGE UP (ref 4–32)
BILIRUB SERPL-MCNC: <0.2 MG/DL — SIGNIFICANT CHANGE UP (ref 0.2–1.2)
BUN SERPL-MCNC: 5 MG/DL — LOW (ref 7–23)
CALCIUM SERPL-MCNC: 9.3 MG/DL — SIGNIFICANT CHANGE UP (ref 8.4–10.5)
CHLORIDE SERPL-SCNC: 103 MMOL/L — SIGNIFICANT CHANGE UP (ref 98–107)
CO2 SERPL-SCNC: 21 MMOL/L — LOW (ref 22–31)
CREAT SERPL-MCNC: 0.44 MG/DL — LOW (ref 0.5–1.3)
EGFR: 129 ML/MIN/1.73M2 — SIGNIFICANT CHANGE UP
GLUCOSE SERPL-MCNC: 91 MG/DL — SIGNIFICANT CHANGE UP (ref 70–99)
HCT VFR BLD CALC: 30.4 % — LOW (ref 34.5–45)
HGB BLD-MCNC: 9.8 G/DL — LOW (ref 11.5–15.5)
MAGNESIUM SERPL-MCNC: 2 MG/DL — SIGNIFICANT CHANGE UP (ref 1.6–2.6)
MCHC RBC-ENTMCNC: 27.9 PG — SIGNIFICANT CHANGE UP (ref 27–34)
MCHC RBC-ENTMCNC: 32.2 GM/DL — SIGNIFICANT CHANGE UP (ref 32–36)
MCV RBC AUTO: 86.6 FL — SIGNIFICANT CHANGE UP (ref 80–100)
NRBC # BLD: 0 /100 WBCS — SIGNIFICANT CHANGE UP (ref 0–0)
NRBC # FLD: 0 K/UL — SIGNIFICANT CHANGE UP (ref 0–0)
PHOSPHATE SERPL-MCNC: 3.3 MG/DL — SIGNIFICANT CHANGE UP (ref 2.5–4.5)
PLATELET # BLD AUTO: 322 K/UL — SIGNIFICANT CHANGE UP (ref 150–400)
POTASSIUM SERPL-MCNC: 4.1 MMOL/L — SIGNIFICANT CHANGE UP (ref 3.5–5.3)
POTASSIUM SERPL-SCNC: 4.1 MMOL/L — SIGNIFICANT CHANGE UP (ref 3.5–5.3)
PROT SERPL-MCNC: 6.9 G/DL — SIGNIFICANT CHANGE UP (ref 6–8.3)
RBC # BLD: 3.51 M/UL — LOW (ref 3.8–5.2)
RBC # FLD: 13.3 % — SIGNIFICANT CHANGE UP (ref 10.3–14.5)
SODIUM SERPL-SCNC: 137 MMOL/L — SIGNIFICANT CHANGE UP (ref 135–145)
WBC # BLD: 9.64 K/UL — SIGNIFICANT CHANGE UP (ref 3.8–10.5)
WBC # FLD AUTO: 9.64 K/UL — SIGNIFICANT CHANGE UP (ref 3.8–10.5)

## 2024-09-10 RX ORDER — OXYCODONE HYDROCHLORIDE 5 MG/1
1 TABLET ORAL
Qty: 12 | Refills: 0
Start: 2024-09-10 | End: 2024-09-12

## 2024-09-10 RX ORDER — ACETAMINOPHEN 325 MG/1
3 TABLET ORAL
Qty: 0 | Refills: 0 | DISCHARGE
Start: 2024-09-10

## 2024-09-10 RX ADMIN — ENOXAPARIN SODIUM 40 MILLIGRAM(S): 100 INJECTION SUBCUTANEOUS at 11:23

## 2024-09-10 RX ADMIN — OXYCODONE HYDROCHLORIDE 5 MILLIGRAM(S): 5 TABLET ORAL at 11:18

## 2024-09-10 NOTE — DISCHARGE NOTE NURSING/CASE MANAGEMENT/SOCIAL WORK - NSDCPNINST_GEN_ALL_CORE
Notify Dr Hooper if you experience any increase in pain not relieved with medication, any redness, drainage or swelling around incisions, any persistent nausea or vomiting or any fever >100.5.  Drink plenty of fluids.  No heavy lifting or straining.  Use over the counter stool softeners to assist with constipation.

## 2024-09-10 NOTE — PROGRESS NOTE ADULT - ASSESSMENT
Assessment:  33 year old female with recurrent history of biliary cholic now s/p laparoscopic cholecystectomy    Plan:  - Pain control as needed  - DVT ppx: Lovenox   - Regular Diet: Halal  - OOB and ambulating as tolerated  - Patient can go home if tolerating a diet and ambulating    B Team Surgery   #[id number].

## 2024-09-10 NOTE — DISCHARGE NOTE NURSING/CASE MANAGEMENT/SOCIAL WORK - PATIENT PORTAL LINK FT
You can access the FollowMyHealth Patient Portal offered by [facility] by registering at the following website: [email or web address]. By joining [facility]’s FollowMyHealth portal, you will also be able to view your health information using other applications (apps) compatible with our system.

## 2024-09-10 NOTE — PROGRESS NOTE ADULT - SUBJECTIVE AND OBJECTIVE BOX
ANESTHESIA POSTOP CHECK    33y Female POSTOP DAY 1     Vital Signs Last 24 Hrs  T(C): 36.7 (10 Sep 2024 09:31), Max: 36.8 (09 Sep 2024 14:00)  T(F): 98 (10 Sep 2024 09:31), Max: 98.3 (09 Sep 2024 14:00)  HR: 73 (10 Sep 2024 09:31) (72 - 101)  BP: 113/72 (10 Sep 2024 09:31) (100/64 - 115/72)  BP(mean): 77 (09 Sep 2024 14:30) (76 - 83)  RR: 18 (10 Sep 2024 09:31) (12 - 18)  SpO2: 100% (10 Sep 2024 09:31) (96% - 100%)    Parameters below as of 10 Sep 2024 09:31  Patient On (Oxygen Delivery Method): room air      I&O's Summary    09 Sep 2024 07:01  -  10 Sep 2024 07:00  --------------------------------------------------------  IN: 100 mL / OUT: 350 mL / NET: -250 mL        [X ] NO APPARENT ANESTHESIA COMPLICATIONS      Comments: 
B Team Surgery Daily Progress Note  =====================================================    SUBJECTIVE: Patient seen and examined at bedside on AM rounds. Pt states notes improvement nausea and pain well controlled    ALLERGIES:  No Known Allergies      --------------------------------------------------------------------------------------    MEDICATIONS:    Neurologic Medications  acetaminophen     Tablet .. 975 milliGRAM(s) Oral every 6 hours PRN Mild Pain (1 - 3)  oxyCODONE    IR 2.5 milliGRAM(s) Oral every 6 hours PRN Moderate Pain (4 - 6)  oxyCODONE    IR 5 milliGRAM(s) Oral every 6 hours PRN Severe Pain (7 - 10)    Respiratory Medications    Cardiovascular Medications    Gastrointestinal Medications    Genitourinary Medications    Hematologic/Oncologic Medications  enoxaparin Injectable 40 milliGRAM(s) SubCutaneous every 24 hours    Antimicrobial/Immunologic Medications    Endocrine/Metabolic Medications    Topical/Other Medications    --------------------------------------------------------------------------------------    VITAL SIGNS:  T(C): 36.5 (09-10-24 @ 06:00), Max: 36.8 (09-09-24 @ 14:00)  HR: 74 (09-10-24 @ 06:00) (72 - 101)  BP: 108/73 (09-10-24 @ 06:00) (89/61 - 126/93)  RR: 17 (09-10-24 @ 06:00) (12 - 18)  SpO2: 100% (09-10-24 @ 06:00) (94% - 100%)  --------------------------------------------------------------------------------------    INS AND OUTS:    09-09-24 @ 07:01  -  09-10-24 @ 07:00  --------------------------------------------------------  IN: 100 mL / OUT: 350 mL / NET: -250 mL      --------------------------------------------------------------------------------------  Physical Exam:  General: NAD, resting comfortably in bed, somnolent   Pulmonary: Nonlabored breathing, no respiratory distress  Abdominal: soft, appropriately tender  Skin: Incisions c/d/i, dermabond in over skin incisions    --------------------------------------------------------------------------------------    LABS

## 2024-09-10 NOTE — DISCHARGE NOTE NURSING/CASE MANAGEMENT/SOCIAL WORK - NSDCPECAREGIVERED_GEN_ALL_CORE
lap walter, pain after surgery, incision action plan Lap walter  pain management  caring for my incision

## 2024-09-10 NOTE — DISCHARGE NOTE NURSING/CASE MANAGEMENT/SOCIAL WORK - NSDCPEFALRISK_GEN_ALL_CORE
For information on Fall & Injury Prevention, visit: [email or web address] OR  [email or web address] OR  https://www.cdc.gov/steadi/patient.html

## 2024-09-13 LAB — SURGICAL PATHOLOGY STUDY: SIGNIFICANT CHANGE UP

## 2024-09-24 ENCOUNTER — NON-APPOINTMENT (OUTPATIENT)
Age: 34
End: 2024-09-24

## 2024-09-24 ENCOUNTER — APPOINTMENT (OUTPATIENT)
Dept: SURGERY | Facility: HOSPITAL | Age: 34
End: 2024-09-24
Payer: COMMERCIAL

## 2024-09-24 VITALS
HEIGHT: 59 IN | WEIGHT: 144 LBS | DIASTOLIC BLOOD PRESSURE: 64 MMHG | TEMPERATURE: 98.1 F | SYSTOLIC BLOOD PRESSURE: 113 MMHG | BODY MASS INDEX: 29.03 KG/M2 | HEART RATE: 97 BPM

## 2024-09-24 PROCEDURE — 99024 POSTOP FOLLOW-UP VISIT: CPT

## 2024-09-24 NOTE — HISTORY OF PRESENT ILLNESS
[de-identified] : Patient s/p lap walter. Patient tolerating diet, pain controlled. Incisions well healed. Pathology consistent with acute cholecystitis.

## 2024-10-21 ENCOUNTER — NON-APPOINTMENT (OUTPATIENT)
Age: 34
End: 2024-10-21

## 2024-10-21 ENCOUNTER — APPOINTMENT (OUTPATIENT)
Dept: OBGYN | Facility: CLINIC | Age: 34
End: 2024-10-21
Payer: COMMERCIAL

## 2024-10-21 ENCOUNTER — OUTPATIENT (OUTPATIENT)
Dept: OUTPATIENT SERVICES | Facility: HOSPITAL | Age: 34
LOS: 1 days | End: 2024-10-21
Payer: COMMERCIAL

## 2024-10-21 VITALS
BODY MASS INDEX: 29.84 KG/M2 | OXYGEN SATURATION: 99 % | SYSTOLIC BLOOD PRESSURE: 115 MMHG | TEMPERATURE: 98.1 F | DIASTOLIC BLOOD PRESSURE: 79 MMHG | RESPIRATION RATE: 18 BRPM | HEIGHT: 59 IN | WEIGHT: 148 LBS | HEART RATE: 106 BPM

## 2024-10-21 DIAGNOSIS — Z01.411 ENCOUNTER FOR GYNECOLOGICAL EXAMINATION (GENERAL) (ROUTINE) WITH ABNORMAL FINDINGS: ICD-10-CM

## 2024-10-21 DIAGNOSIS — D21.9 BENIGN NEOPLASM OF CONNECTIVE AND OTHER SOFT TISSUE, UNSPECIFIED: ICD-10-CM

## 2024-10-21 DIAGNOSIS — Z71.2 PERSON CONSULTING FOR EXPLANATION OF EXAMINATION OR TEST FINDINGS: ICD-10-CM

## 2024-10-21 DIAGNOSIS — Z00.00 ENCOUNTER FOR GENERAL ADULT MEDICAL EXAMINATION WITHOUT ABNORMAL FINDINGS: ICD-10-CM

## 2024-10-21 PROCEDURE — 99213 OFFICE O/P EST LOW 20 MIN: CPT

## 2024-10-21 PROCEDURE — G0463: CPT

## 2024-10-23 DIAGNOSIS — D21.9 BENIGN NEOPLASM OF CONNECTIVE AND OTHER SOFT TISSUE, UNSPECIFIED: ICD-10-CM

## 2024-10-23 DIAGNOSIS — Z71.2 PERSON CONSULTING FOR EXPLANATION OF EXAMINATION OR TEST FINDINGS: ICD-10-CM

## 2025-05-19 NOTE — ED ADULT TRIAGE NOTE - HEART RATE (BEATS/MIN)
Detail Level: Generalized Sunscreen Recommendations: Blue Lizard SPF 30 Detail Level: Detailed Quality 137: Melanoma: Continuity Of Care - Recall System: Patient information entered into a recall system that includes: target date for the next exam specified AND a process to follow up with patients regarding missed or unscheduled appointments When Should The Patient Follow-Up For Their Next Full-Body Skin Exam?: 6 Months 79

## (undated) DEVICE — POSITIONER PURPLE ARM ONE STEP (LARGE)

## (undated) DEVICE — VENODYNE/SCD SLEEVE FOOT

## (undated) DEVICE — TUBING SUCTION 20FT

## (undated) DEVICE — DRSG DERMABOND 0.7ML

## (undated) DEVICE — POSITIONER FOAM HEAD CRADLE (PINK)

## (undated) DEVICE — GLV 7 PROTEXIS (WHITE)

## (undated) DEVICE — GLV 7.5 PROTEXIS (WHITE)

## (undated) DEVICE — VENODYNE/SCD SLEEVE CALF MEDIUM

## (undated) DEVICE — D HELP - CLEARVIEW CLEARIFY SYSTEM

## (undated) DEVICE — SOL IRR POUR NS 0.9% 500ML

## (undated) DEVICE — XI 12MM AND STAPLER CANNULA SEAL

## (undated) DEVICE — XI SEAL UNIVERSIAL 5-12MM

## (undated) DEVICE — TUBING STRYKEFLOW II SUCTION / IRRIGATOR

## (undated) DEVICE — PREP CHLORAPREP HI-LITE ORANGE 26ML

## (undated) DEVICE — BLADE SCALPEL SAFETYLOCK #10

## (undated) DEVICE — POSITIONER FOAM EGG CRATE ULNAR 2PCS (PINK)

## (undated) DEVICE — SUT VICRYL 0 27" UR-6

## (undated) DEVICE — ELCTR BOVIE PENCIL SMOKE EVACUATION

## (undated) DEVICE — SUT MONOCRYL 4-0 27" PS-2 UNDYED

## (undated) DEVICE — ENDOCATCH 10MM SPECIMEN POUCH

## (undated) DEVICE — WARMING BLANKET LOWER ADULT

## (undated) DEVICE — SOL IRR POUR H2O 250ML